# Patient Record
Sex: FEMALE | Race: WHITE | NOT HISPANIC OR LATINO | URBAN - METROPOLITAN AREA
[De-identification: names, ages, dates, MRNs, and addresses within clinical notes are randomized per-mention and may not be internally consistent; named-entity substitution may affect disease eponyms.]

---

## 2017-07-17 ENCOUNTER — EMERGENCY (EMERGENCY)
Facility: HOSPITAL | Age: 37
LOS: 1 days | Discharge: HOME | End: 2017-07-17
Attending: EMERGENCY MEDICINE | Admitting: EMERGENCY MEDICINE
Payer: COMMERCIAL

## 2017-07-17 VITALS
OXYGEN SATURATION: 97 % | SYSTOLIC BLOOD PRESSURE: 135 MMHG | RESPIRATION RATE: 20 BRPM | TEMPERATURE: 98 F | DIASTOLIC BLOOD PRESSURE: 90 MMHG | HEART RATE: 100 BPM

## 2017-07-17 PROCEDURE — 81025 URINE PREGNANCY TEST: CPT

## 2017-07-17 PROCEDURE — 99283 EMERGENCY DEPT VISIT LOW MDM: CPT | Mod: 25

## 2017-07-17 PROCEDURE — 99285 EMERGENCY DEPT VISIT HI MDM: CPT

## 2017-07-17 NOTE — ED ADULT NURSE NOTE - OBJECTIVE STATEMENT
35 y/o w/f bib via ems after a bystander called 911 on her, she was in a parking lot crying hysterically, upon arrival to ER, pt. was crying, upset  and angry. pt. denies any s/hi, no a/v hallucinations or delusions, reports she take paxil 2mg for depression, also, she reports that she was admitted to a  etoh  rehabilitation, but denies any psychiatric admissions.

## 2017-07-17 NOTE — ED PROVIDER NOTE - ATTENDING CONTRIBUTION TO CARE
------------ATTENDING NOTE------------   37 yo F w/ friend c/o feeling anxious and depressed over recent alcohol abuse, describes drinking all day in secret at home, verbally arguing with  over alcohol use, running out of house, calling friend to come to ED, no additional drug or intoxicant use, no trauma, declines SI or HI or hallucinations, awaiting reassessment, reevaluations as sobering, no h/o withdrawal or seizures, last regular drinking 1 month ago -->  - Atilio Gale MD   ----------------------------------------------------------------------------------

## 2017-07-17 NOTE — ED PROVIDER NOTE - NSTIMEPROVIDERCAREINITIATE_GEN_ER
17-Jul-2017 22:55 Pediatric Attending Addendum:  I have read and agree with surrounding PGY1 Note except for any edits above or changes detailed below.   I have spent > 30 minutes with the patient and/or the patient's family on direct patient care.      GEN: NAD alert active  HEENT: MMM, AFOF, no cleft, +red reflex bilaterally  CHEST: nml s1/s2, RRR, no m, lcta bl  Abd: s/nt/nd +bs no hsm  umb c/d/i  Neuro: +grasp/suck/garcía  Skin: no rash appreciated  Musculoskeletal: negative Ortalani/Ndiaye, no clavicular crepitus appreciated, FROM  : external genitalia wnl    Leigha Flores MD Pediatric Hospitalist

## 2017-07-17 NOTE — ED PROVIDER NOTE - OBJECTIVE STATEMENT
35 y/o F p/w depression and 35 y/o F p/w depression and intoxication. Has been having difficulty with her male partner and has been drinking more alcohol lately. Not drinking every day. Never had withdrawal symptoms. Denies SI or HI.  PMHx: None  PSHx: None

## 2017-07-18 VITALS
HEART RATE: 89 BPM | RESPIRATION RATE: 18 BRPM | TEMPERATURE: 98 F | DIASTOLIC BLOOD PRESSURE: 85 MMHG | OXYGEN SATURATION: 98 % | SYSTOLIC BLOOD PRESSURE: 129 MMHG

## 2017-07-18 LAB — HCG UR QL: NEGATIVE — SIGNIFICANT CHANGE UP

## 2017-07-18 NOTE — ED ADULT NURSE REASSESSMENT NOTE - NS ED NURSE REASSESS COMMENT FT1
Pt. was discharged around 0135 accompanied by her friend. pt. was stable for discharge and no evidenced of psychosis, s/hi.It was reiterated to her to follow up w/ her psychiatrist as soon as possible. Pt. was calm, coherent and in control of her impulses when she left the ED.

## 2019-07-12 ENCOUNTER — OTHER (OUTPATIENT)
Age: 39
End: 2019-07-12

## 2019-07-12 DIAGNOSIS — Z01.419 ENCOUNTER FOR GYNECOLOGICAL EXAMINATION (GENERAL) (ROUTINE) W/OUT ABNORMAL FINDINGS: ICD-10-CM

## 2019-12-03 ENCOUNTER — RESULT REVIEW (OUTPATIENT)
Age: 39
End: 2019-12-03

## 2020-12-21 PROBLEM — Z01.419 ENCOUNTER FOR ROUTINE GYNECOLOGICAL EXAMINATION: Status: RESOLVED | Noted: 2019-07-12 | Resolved: 2020-12-21

## 2021-04-08 ENCOUNTER — RESULT REVIEW (OUTPATIENT)
Age: 41
End: 2021-04-08

## 2022-07-12 ENCOUNTER — NON-APPOINTMENT (OUTPATIENT)
Age: 42
End: 2022-07-12

## 2022-09-16 ENCOUNTER — APPOINTMENT (OUTPATIENT)
Dept: ORTHOPEDIC SURGERY | Facility: CLINIC | Age: 42
End: 2022-09-16

## 2022-09-16 VITALS — HEIGHT: 65 IN | WEIGHT: 118 LBS | BODY MASS INDEX: 19.66 KG/M2

## 2022-09-16 DIAGNOSIS — Z78.9 OTHER SPECIFIED HEALTH STATUS: ICD-10-CM

## 2022-09-16 DIAGNOSIS — M25.569 PAIN IN UNSPECIFIED KNEE: ICD-10-CM

## 2022-09-16 PROCEDURE — 99213 OFFICE O/P EST LOW 20 MIN: CPT

## 2022-09-16 PROCEDURE — 73562 X-RAY EXAM OF KNEE 3: CPT | Mod: LT

## 2022-09-16 RX ORDER — DICLOFENAC SODIUM 50 MG/1
50 TABLET, DELAYED RELEASE ORAL
Qty: 40 | Refills: 0 | Status: ACTIVE | COMMUNITY
Start: 2022-09-16 | End: 1900-01-01

## 2022-09-16 NOTE — ASSESSMENT
[FreeTextEntry1] : chronic left knee pain but much worse after sail-boarding august 2022.  sharp medial and anterior knee pain.  possible mmt. \par \par friend of althea

## 2022-09-16 NOTE — PHYSICAL EXAM
[NL (0)] : extension 0 degrees [5___] : hamstring 5[unfilled]/5 [Positive] : positive Uvaldo [Left] : left knee [There are no fractures, subluxations or dislocations. No significant abnormalities are seen] : There are no fractures, subluxations or dislocations. No significant abnormalities are seen [4___] : quadriceps 4[unfilled]/5 [] : non-antalgic [TWNoteComboBox7] : flexion 130 degrees

## 2022-09-16 NOTE — DISCUSSION/SUMMARY
[de-identified] : Instructions:  Progress Note completed by Cari Hall PA-C\par * Dr. Mckeon -- The documentation recorded accurately reflects the decisions made by me during this visit.

## 2022-09-16 NOTE — HISTORY OF PRESENT ILLNESS
[9] : 9 [8] : 8 [Sharp] : sharp [Tightness] : tightness [de-identified] : 9/16/22:  chronic left knee pain but much worse after sail-boarding august 2022.  [] : no [FreeTextEntry1] : left knee [FreeTextEntry6] : senstive

## 2022-09-19 ENCOUNTER — RESULT REVIEW (OUTPATIENT)
Age: 42
End: 2022-09-19

## 2022-09-19 ENCOUNTER — APPOINTMENT (OUTPATIENT)
Dept: MRI IMAGING | Facility: CLINIC | Age: 42
End: 2022-09-19

## 2022-09-30 ENCOUNTER — APPOINTMENT (OUTPATIENT)
Dept: ORTHOPEDIC SURGERY | Facility: CLINIC | Age: 42
End: 2022-09-30

## 2022-09-30 VITALS — BODY MASS INDEX: 19.66 KG/M2 | WEIGHT: 118 LBS | HEIGHT: 65 IN

## 2022-09-30 DIAGNOSIS — S83.92XA SPRAIN OF UNSPECIFIED SITE OF LEFT KNEE, INITIAL ENCOUNTER: ICD-10-CM

## 2022-09-30 PROCEDURE — 99214 OFFICE O/P EST MOD 30 MIN: CPT

## 2022-09-30 NOTE — ASSESSMENT
[FreeTextEntry1] : chronic left knee pain but much worse after sail-boarding august 2022.  horizontal cleavage tear.  looks unstable.  may need surgery but feeling better now.\par \par friend of althea

## 2022-09-30 NOTE — HISTORY OF PRESENT ILLNESS
[Dull/Aching] : dull/aching [Sharp] : sharp [Throbbing] : throbbing [9] : 9 [8] : 8 [Tightness] : tightness [de-identified] : 9/16/22:  chronic left knee pain but much worse after sail-boarding august 2022. \par 9/30/22: still some pain left knee.   [] : no [FreeTextEntry1] : left knee [FreeTextEntry6] : senstive  [de-identified] : physical therapy

## 2022-09-30 NOTE — PHYSICAL EXAM
[NL (0)] : extension 0 degrees [4___] : quadriceps 4[unfilled]/5 [5___] : hamstring 5[unfilled]/5 [Positive] : positive Uvaldo [Left] : left knee [There are no fractures, subluxations or dislocations. No significant abnormalities are seen] : There are no fractures, subluxations or dislocations. No significant abnormalities are seen [] : non-antalgic [TWNoteComboBox7] : flexion 130 degrees

## 2022-11-04 ENCOUNTER — APPOINTMENT (OUTPATIENT)
Dept: ORTHOPEDIC SURGERY | Facility: CLINIC | Age: 42
End: 2022-11-04

## 2022-11-11 ENCOUNTER — APPOINTMENT (OUTPATIENT)
Dept: ORTHOPEDIC SURGERY | Facility: CLINIC | Age: 42
End: 2022-11-11

## 2022-11-11 VITALS — BODY MASS INDEX: 19.66 KG/M2 | WEIGHT: 118 LBS | HEIGHT: 65 IN

## 2022-11-11 DIAGNOSIS — S83.242A OTHER TEAR OF MEDIAL MENISCUS, CURRENT INJURY, LEFT KNEE, INITIAL ENCOUNTER: ICD-10-CM

## 2022-11-11 DIAGNOSIS — M23.92 UNSPECIFIED INTERNAL DERANGEMENT OF LEFT KNEE: ICD-10-CM

## 2022-11-11 PROCEDURE — 99213 OFFICE O/P EST LOW 20 MIN: CPT

## 2022-11-11 NOTE — HISTORY OF PRESENT ILLNESS
[Localized] : localized [9] : 9 [8] : 8 [Dull/Aching] : dull/aching [Sharp] : sharp [Throbbing] : throbbing [Tightness] : tightness [de-identified] : 9/16/22:  chronic left knee pain but much worse after sail-boarding august 2022. \par 9/30/22: still some pain left knee.  \par 11/11/22:  pain and forth.  overall improved but still has pain. [] : no [FreeTextEntry1] : left knee [FreeTextEntry6] : senstive  [de-identified] : physical therapy

## 2024-04-02 ENCOUNTER — INPATIENT (INPATIENT)
Facility: HOSPITAL | Age: 44
LOS: 3 days | Discharge: HOME | DRG: 100 | End: 2024-04-06
Attending: STUDENT IN AN ORGANIZED HEALTH CARE EDUCATION/TRAINING PROGRAM | Admitting: STUDENT IN AN ORGANIZED HEALTH CARE EDUCATION/TRAINING PROGRAM
Payer: COMMERCIAL

## 2024-04-02 VITALS
OXYGEN SATURATION: 95 % | HEART RATE: 93 BPM | SYSTOLIC BLOOD PRESSURE: 125 MMHG | RESPIRATION RATE: 18 BRPM | DIASTOLIC BLOOD PRESSURE: 84 MMHG | HEIGHT: 65 IN | TEMPERATURE: 99 F | WEIGHT: 134.92 LBS

## 2024-04-02 LAB
ALBUMIN SERPL ELPH-MCNC: 4.4 G/DL — SIGNIFICANT CHANGE UP (ref 3.3–5)
ALP SERPL-CCNC: 92 U/L — SIGNIFICANT CHANGE UP (ref 40–120)
ALT FLD-CCNC: 156 U/L — HIGH (ref 10–45)
AMMONIA BLD-MCNC: 26 UMOL/L — SIGNIFICANT CHANGE UP (ref 11–55)
ANION GAP SERPL CALC-SCNC: 23 MMOL/L — HIGH (ref 5–17)
APAP SERPL-MCNC: <15 UG/ML — SIGNIFICANT CHANGE UP (ref 10–30)
AST SERPL-CCNC: 220 U/L — HIGH (ref 10–40)
BILIRUB SERPL-MCNC: 0.9 MG/DL — SIGNIFICANT CHANGE UP (ref 0.2–1.2)
BUN SERPL-MCNC: 15 MG/DL — SIGNIFICANT CHANGE UP (ref 7–23)
CALCIUM SERPL-MCNC: 9.3 MG/DL — SIGNIFICANT CHANGE UP (ref 8.4–10.5)
CHLORIDE SERPL-SCNC: 87 MMOL/L — LOW (ref 96–108)
CO2 SERPL-SCNC: 20 MMOL/L — LOW (ref 22–31)
CREAT SERPL-MCNC: 0.51 MG/DL — SIGNIFICANT CHANGE UP (ref 0.5–1.3)
EGFR: 120 ML/MIN/1.73M2 — SIGNIFICANT CHANGE UP
ETHANOL SERPL-MCNC: <10 MG/DL — SIGNIFICANT CHANGE UP (ref 0–10)
GAS PNL BLDV: SIGNIFICANT CHANGE UP
GLUCOSE SERPL-MCNC: 140 MG/DL — HIGH (ref 70–99)
HCG SERPL-ACNC: <2 MIU/ML — SIGNIFICANT CHANGE UP
HCT VFR BLD CALC: 35.8 % — SIGNIFICANT CHANGE UP (ref 34.5–45)
HGB BLD-MCNC: 12.3 G/DL — SIGNIFICANT CHANGE UP (ref 11.5–15.5)
MCHC RBC-ENTMCNC: 32.6 PG — SIGNIFICANT CHANGE UP (ref 27–34)
MCHC RBC-ENTMCNC: 34.4 GM/DL — SIGNIFICANT CHANGE UP (ref 32–36)
MCV RBC AUTO: 95 FL — SIGNIFICANT CHANGE UP (ref 80–100)
PLATELET # BLD AUTO: 127 K/UL — LOW (ref 150–400)
POTASSIUM SERPL-MCNC: 3.6 MMOL/L — SIGNIFICANT CHANGE UP (ref 3.5–5.3)
POTASSIUM SERPL-SCNC: 3.6 MMOL/L — SIGNIFICANT CHANGE UP (ref 3.5–5.3)
PROT SERPL-MCNC: 7.3 G/DL — SIGNIFICANT CHANGE UP (ref 6–8.3)
RBC # BLD: 3.77 M/UL — LOW (ref 3.8–5.2)
RBC # FLD: 14.6 % — HIGH (ref 10.3–14.5)
SALICYLATES SERPL-MCNC: <2 MG/DL — LOW (ref 15–30)
SODIUM SERPL-SCNC: 130 MMOL/L — LOW (ref 135–145)
WBC # BLD: 2.75 K/UL — LOW (ref 3.8–10.5)
WBC # FLD AUTO: 2.75 K/UL — LOW (ref 3.8–10.5)

## 2024-04-02 PROCEDURE — 99285 EMERGENCY DEPT VISIT HI MDM: CPT

## 2024-04-02 PROCEDURE — 71045 X-RAY EXAM CHEST 1 VIEW: CPT | Mod: 26

## 2024-04-02 PROCEDURE — 70450 CT HEAD/BRAIN W/O DYE: CPT | Mod: 26,MC

## 2024-04-02 RX ORDER — SODIUM CHLORIDE 9 MG/ML
1000 INJECTION INTRAMUSCULAR; INTRAVENOUS; SUBCUTANEOUS ONCE
Refills: 0 | Status: COMPLETED | OUTPATIENT
Start: 2024-04-02 | End: 2024-04-02

## 2024-04-02 RX ORDER — THIAMINE MONONITRATE (VIT B1) 100 MG
100 TABLET ORAL ONCE
Refills: 0 | Status: COMPLETED | OUTPATIENT
Start: 2024-04-02 | End: 2024-04-02

## 2024-04-02 RX ORDER — FOLIC ACID 0.8 MG
1 TABLET ORAL ONCE
Refills: 0 | Status: COMPLETED | OUTPATIENT
Start: 2024-04-02 | End: 2024-04-02

## 2024-04-02 RX ORDER — DIAZEPAM 5 MG
10 TABLET ORAL ONCE
Refills: 0 | Status: DISCONTINUED | OUTPATIENT
Start: 2024-04-02 | End: 2024-04-02

## 2024-04-02 RX ADMIN — Medication 100 MILLIGRAM(S): at 23:24

## 2024-04-02 RX ADMIN — Medication 10 MILLIGRAM(S): at 22:48

## 2024-04-02 RX ADMIN — SODIUM CHLORIDE 1000 MILLILITER(S): 9 INJECTION INTRAMUSCULAR; INTRAVENOUS; SUBCUTANEOUS at 23:26

## 2024-04-02 NOTE — ED ADULT NURSE NOTE - OBJECTIVE STATEMENT
44 y/o Female BIBEMS s/p unwitnessed fall at home. Pt found by  on floor with lack of response to speech. Pt arouse after snoring respirations confused and agitated. As per EMS pt  stated previous hx of alcohol abuse. PMHx anxiety, palpitations. On assessment pt complains of palpitations. Pt denies hx of alcohol abuse, states her last drink was about 4 days ago and was "a few glasses of wine." Pt denies f/c/n/v/d SOB, chest pain. Pt is AxOx4. Pt is continent and ambulatory. Stretcher locked and in lowest position, appropriate side rails up. Pt instructed to notify RN if assistance is needed.

## 2024-04-02 NOTE — ED PROVIDER NOTE - ATTENDING CONTRIBUTION TO CARE
I was the supervising attending. I have independently seen face-to-face and examined the patient. I have reviewed the history and physical and discussed the MDM with the resident, fellow, SONIA and/or student. I agree with the assessment and plan as presented unless otherwise documented as follows:    43F hx anxiety, alcohol use disorder, presenting w/ concern for seizure. Patient states she feels fine and does not recall what happened. Per collateral from patient's mother at bedside, patient's son saw her walking up the stairs when she suddenly collapsed and had whole body shaking. Patient's mother came and observed this, noted spontaneous resolution after which patient was sleepy and confused, did not know who the president was or where she was. Patient's mother endorses patient drinks regularly but unclear when last drink was, thinks she has been trying to cut back recently. Currently back to baseline. Awake and alert, mildly anxious-appearing, no acute distress, tachycardic 100-110s. Mildly tremulous, no signs of traumatic injury. C/f EtOH withdrawal with seizure, will give Valium, obtain labs, CT/XR imaging to evaluate for injuries. -Bruna March MD (Attending)

## 2024-04-02 NOTE — ED PROVIDER NOTE - CLINICAL SUMMARY MEDICAL DECISION MAKING FREE TEXT BOX
Christiano PGY3: 42yo F with PMH of anxiety, palpitations, alcohol abuse presents for possible seizure like activity possibly etoh wd. 1st time, no hx. Unknown if hit head but likely. Differential Diagnosis includes but not limited to alcohol wd vs primary seizure vs intracranial pathology vs electrolyte abnormality. labs, alcohol level, vbg/ammonia, cth. dispo based on results. benzo empirically given tremors and fasciculations in setting of concern for alcohol wd.

## 2024-04-02 NOTE — ED PROVIDER NOTE - PHYSICAL EXAMINATION
CONSTITUTIONAL: guarded, mildly anxious appearing F in no acute distress   SKIN: Warm dry  HEAD: NCAT; no lac/hematoma or ttp, no step-off  EYES: pupils 3mm b/l ,briskly reactive   ENT: MMM; no tongue bite  NECK: Supple; non tender spine   CARD: RRR, no chest/rib/clavicular ttp   RESP: lungs CTA   ABD: S/NT no R/G; no signs of incontinence   EXT: no LE edema/deformity   NEURO: Grossly unremarkable; moving all extremities but +tongue fasciculations and b/l hand tremors.   PSYCH: Cooperative, appropriate.

## 2024-04-02 NOTE — ED PROVIDER NOTE - OBJECTIVE STATEMENT
42yo F with PMH of anxiety, palpitations, alcohol abuse presents for possible seizure like activity. Per 's report, he heard a thud, found her on the floor generalized shaking with lack of response to verbal stimuli. After sleepy with some snoring respirations, when she woke up was confused and belligerent with EMS arrived, demanding to know why they were in the house. Currently denies HA, CP, SOB, abd pain, NVDC. no hx of seizures. Hx of alcohol abuse but per mother at bedside evasive about use even with family and drinks in secret. Pt denies benzo use, endorses 'a few glasses of wine' with last drink reported 4d ago but family cannot collaborate. denies other falls that they know of or hx of seizures. They've been trying to get her to rehab, so she may have been attempting recently to cut back. 44yo F with PMH of anxiety, palpitations, alcohol abuse presents for possible seizure like activity. Per 's report, he heard a thud, found her on the floor generalized shaking with lack of response to verbal stimuli. After sleepy with some snoring respirations, when she woke up was confused and belligerent with EMS arrived, demanding to know why they were in the house. Currently denies HA, CP, SOB, abd pain, NVDC. no hx of seizures. Hx of alcohol abuse but per mother at bedside evasive about use even with family and drinks in secret. Pt denies benzo use, endorses 'a few glasses of wine' with last drink reported 4d ago but family cannot corroborate. Denies other falls that they know of or hx of seizures. They've been trying to get her to rehab, so she may have been attempting recently to cut back.

## 2024-04-02 NOTE — ED ADULT NURSE NOTE - NSICDXPASTMEDICALHX_GEN_ALL_CORE_FT
PAST MEDICAL HISTORY:  Staph Infection s/p right inguinal hernia repair 6 weeks post partum 2009

## 2024-04-03 DIAGNOSIS — F10.99 ALCOHOL USE, UNSPECIFIED WITH UNSPECIFIED ALCOHOL-INDUCED DISORDER: ICD-10-CM

## 2024-04-03 DIAGNOSIS — R74.8 ABNORMAL LEVELS OF OTHER SERUM ENZYMES: ICD-10-CM

## 2024-04-03 DIAGNOSIS — S06.5XAA TRAUMATIC SUBDURAL HEMORRHAGE WITH LOSS OF CONSCIOUSNESS STATUS UNKNOWN, INITIAL ENCOUNTER: ICD-10-CM

## 2024-04-03 DIAGNOSIS — R56.9 UNSPECIFIED CONVULSIONS: ICD-10-CM

## 2024-04-03 DIAGNOSIS — F41.9 ANXIETY DISORDER, UNSPECIFIED: ICD-10-CM

## 2024-04-03 DIAGNOSIS — I60.9 NONTRAUMATIC SUBARACHNOID HEMORRHAGE, UNSPECIFIED: ICD-10-CM

## 2024-04-03 DIAGNOSIS — E87.20 ACIDOSIS, UNSPECIFIED: ICD-10-CM

## 2024-04-03 LAB
ALBUMIN SERPL ELPH-MCNC: 4.2 G/DL — SIGNIFICANT CHANGE UP (ref 3.3–5)
ALP SERPL-CCNC: 84 U/L — SIGNIFICANT CHANGE UP (ref 40–120)
ALT FLD-CCNC: 117 U/L — HIGH (ref 10–45)
ANION GAP SERPL CALC-SCNC: 16 MMOL/L — SIGNIFICANT CHANGE UP (ref 5–17)
APPEARANCE UR: CLEAR — SIGNIFICANT CHANGE UP
APTT BLD: 22.6 SEC — LOW (ref 24.5–35.6)
AST SERPL-CCNC: 150 U/L — HIGH (ref 10–40)
BASOPHILS # BLD AUTO: 0.05 K/UL — SIGNIFICANT CHANGE UP (ref 0–0.2)
BASOPHILS NFR BLD AUTO: 1.8 % — SIGNIFICANT CHANGE UP (ref 0–2)
BILIRUB SERPL-MCNC: 0.8 MG/DL — SIGNIFICANT CHANGE UP (ref 0.2–1.2)
BILIRUB UR-MCNC: NEGATIVE — SIGNIFICANT CHANGE UP
BLD GP AB SCN SERPL QL: NEGATIVE — SIGNIFICANT CHANGE UP
BUN SERPL-MCNC: 14 MG/DL — SIGNIFICANT CHANGE UP (ref 7–23)
CALCIUM SERPL-MCNC: 9.1 MG/DL — SIGNIFICANT CHANGE UP (ref 8.4–10.5)
CHLORIDE SERPL-SCNC: 94 MMOL/L — LOW (ref 96–108)
CK SERPL-CCNC: 715 U/L — HIGH (ref 25–170)
CO2 SERPL-SCNC: 23 MMOL/L — SIGNIFICANT CHANGE UP (ref 22–31)
COLOR SPEC: YELLOW — SIGNIFICANT CHANGE UP
CREAT SERPL-MCNC: 0.52 MG/DL — SIGNIFICANT CHANGE UP (ref 0.5–1.3)
DIFF PNL FLD: NEGATIVE — SIGNIFICANT CHANGE UP
EGFR: 120 ML/MIN/1.73M2 — SIGNIFICANT CHANGE UP
EOSINOPHIL # BLD AUTO: 0.02 K/UL — SIGNIFICANT CHANGE UP (ref 0–0.5)
EOSINOPHIL NFR BLD AUTO: 0.9 % — SIGNIFICANT CHANGE UP (ref 0–6)
FLUAV AG NPH QL: SIGNIFICANT CHANGE UP
FLUBV AG NPH QL: SIGNIFICANT CHANGE UP
GLUCOSE SERPL-MCNC: 87 MG/DL — SIGNIFICANT CHANGE UP (ref 70–99)
GLUCOSE UR QL: NEGATIVE MG/DL — SIGNIFICANT CHANGE UP
HAV IGM SER-ACNC: SIGNIFICANT CHANGE UP
HBV CORE IGM SER-ACNC: SIGNIFICANT CHANGE UP
HBV SURFACE AG SER-ACNC: SIGNIFICANT CHANGE UP
HCT VFR BLD CALC: 33.2 % — LOW (ref 34.5–45)
HCV AB S/CO SERPL IA: 0.06 S/CO — SIGNIFICANT CHANGE UP (ref 0–0.99)
HCV AB SERPL-IMP: SIGNIFICANT CHANGE UP
HGB BLD-MCNC: 11.3 G/DL — LOW (ref 11.5–15.5)
INR BLD: 1 RATIO — SIGNIFICANT CHANGE UP (ref 0.85–1.18)
KETONES UR-MCNC: 40 MG/DL
LEUKOCYTE ESTERASE UR-ACNC: ABNORMAL
LYMPHOCYTES # BLD AUTO: 0.2 K/UL — LOW (ref 1–3.3)
LYMPHOCYTES # BLD AUTO: 7.1 % — LOW (ref 13–44)
MCHC RBC-ENTMCNC: 32.8 PG — SIGNIFICANT CHANGE UP (ref 27–34)
MCHC RBC-ENTMCNC: 34 GM/DL — SIGNIFICANT CHANGE UP (ref 32–36)
MCV RBC AUTO: 96.2 FL — SIGNIFICANT CHANGE UP (ref 80–100)
MONOCYTES # BLD AUTO: 0.27 K/UL — SIGNIFICANT CHANGE UP (ref 0–0.9)
MONOCYTES NFR BLD AUTO: 9.7 % — SIGNIFICANT CHANGE UP (ref 2–14)
NEUTROPHILS # BLD AUTO: 2.19 K/UL — SIGNIFICANT CHANGE UP (ref 1.8–7.4)
NEUTROPHILS NFR BLD AUTO: 79.6 % — HIGH (ref 43–77)
NITRITE UR-MCNC: NEGATIVE — SIGNIFICANT CHANGE UP
NRBC # BLD: 0 /100 WBCS — SIGNIFICANT CHANGE UP (ref 0–0)
PCP SPEC-MCNC: SIGNIFICANT CHANGE UP
PH UR: 7.5 — SIGNIFICANT CHANGE UP (ref 5–8)
PLATELET # BLD AUTO: 106 K/UL — LOW (ref 150–400)
PLATELET MAPPING ACTF MAX AMPLITUDE: 8.1 MM — SIGNIFICANT CHANGE UP (ref 2–19)
PLATELET MAPPING ADP MAXIMUM AMPLITUDE: 29.3 MM — LOW (ref 45–69)
PLATELET MAPPING ADP PERCENT INHIBITION: 56.4 % — HIGH (ref 0–17)
PLATELET MAPPING ARACHIDONIC ACID INHIBITION: 96.3 % — HIGH (ref 0–11)
PLATELET MAPPING HKH MAXIMUM AMPLITUDE: 56.7 MM — SIGNIFICANT CHANGE UP (ref 53–68)
POTASSIUM SERPL-MCNC: 3 MMOL/L — LOW (ref 3.5–5.3)
POTASSIUM SERPL-SCNC: 3 MMOL/L — LOW (ref 3.5–5.3)
PROT SERPL-MCNC: 6.6 G/DL — SIGNIFICANT CHANGE UP (ref 6–8.3)
PROT UR-MCNC: 30 MG/DL
PROTHROM AB SERPL-ACNC: 10.5 SEC — SIGNIFICANT CHANGE UP (ref 9.5–13)
RBC # BLD: 3.45 M/UL — LOW (ref 3.8–5.2)
RBC # FLD: 14.9 % — HIGH (ref 10.3–14.5)
RH IG SCN BLD-IMP: POSITIVE — SIGNIFICANT CHANGE UP
RSV RNA NPH QL NAA+NON-PROBE: SIGNIFICANT CHANGE UP
SARS-COV-2 RNA SPEC QL NAA+PROBE: SIGNIFICANT CHANGE UP
SODIUM SERPL-SCNC: 133 MMOL/L — LOW (ref 135–145)
SP GR SPEC: 1.01 — SIGNIFICANT CHANGE UP (ref 1–1.03)
T3 SERPL-MCNC: 93 NG/DL — SIGNIFICANT CHANGE UP (ref 80–200)
T4 AB SER-ACNC: 5.1 UG/DL — SIGNIFICANT CHANGE UP (ref 4.6–12)
TSH SERPL-MCNC: 2.27 UIU/ML — SIGNIFICANT CHANGE UP (ref 0.27–4.2)
TSH SERPL-MCNC: 6.29 UIU/ML — HIGH (ref 0.27–4.2)
UROBILINOGEN FLD QL: 1 MG/DL — SIGNIFICANT CHANGE UP (ref 0.2–1)
WBC # BLD: 4.97 K/UL — SIGNIFICANT CHANGE UP (ref 3.8–10.5)
WBC # FLD AUTO: 4.97 K/UL — SIGNIFICANT CHANGE UP (ref 3.8–10.5)

## 2024-04-03 PROCEDURE — 99221 1ST HOSP IP/OBS SF/LOW 40: CPT

## 2024-04-03 PROCEDURE — 70450 CT HEAD/BRAIN W/O DYE: CPT | Mod: 26,MC

## 2024-04-03 PROCEDURE — 99223 1ST HOSP IP/OBS HIGH 75: CPT

## 2024-04-03 PROCEDURE — 76705 ECHO EXAM OF ABDOMEN: CPT | Mod: 26

## 2024-04-03 RX ORDER — LANOLIN ALCOHOL/MO/W.PET/CERES
3 CREAM (GRAM) TOPICAL AT BEDTIME
Refills: 0 | Status: DISCONTINUED | OUTPATIENT
Start: 2024-04-03 | End: 2024-04-06

## 2024-04-03 RX ORDER — FOLIC ACID 0.8 MG
1 TABLET ORAL DAILY
Refills: 0 | Status: DISCONTINUED | OUTPATIENT
Start: 2024-04-03 | End: 2024-04-06

## 2024-04-03 RX ORDER — DIAZEPAM 5 MG
10 TABLET ORAL
Refills: 0 | Status: DISCONTINUED | OUTPATIENT
Start: 2024-04-03 | End: 2024-04-06

## 2024-04-03 RX ORDER — ACETAMINOPHEN 500 MG
1000 TABLET ORAL ONCE
Refills: 0 | Status: COMPLETED | OUTPATIENT
Start: 2024-04-03 | End: 2024-04-03

## 2024-04-03 RX ORDER — DESMOPRESSIN ACETATE 0.1 MG/1
24 TABLET ORAL ONCE
Refills: 0 | Status: DISCONTINUED | OUTPATIENT
Start: 2024-04-03 | End: 2024-04-03

## 2024-04-03 RX ORDER — THIAMINE MONONITRATE (VIT B1) 100 MG
100 TABLET ORAL DAILY
Refills: 0 | Status: DISCONTINUED | OUTPATIENT
Start: 2024-04-03 | End: 2024-04-06

## 2024-04-03 RX ORDER — CITALOPRAM 10 MG/1
10 TABLET, FILM COATED ORAL DAILY
Refills: 0 | Status: DISCONTINUED | OUTPATIENT
Start: 2024-04-03 | End: 2024-04-06

## 2024-04-03 RX ORDER — LEVETIRACETAM 250 MG/1
500 TABLET, FILM COATED ORAL
Refills: 0 | Status: DISCONTINUED | OUTPATIENT
Start: 2024-04-03 | End: 2024-04-06

## 2024-04-03 RX ORDER — SODIUM CHLORIDE 9 MG/ML
1000 INJECTION INTRAMUSCULAR; INTRAVENOUS; SUBCUTANEOUS
Refills: 0 | Status: DISCONTINUED | OUTPATIENT
Start: 2024-04-03 | End: 2024-04-04

## 2024-04-03 RX ORDER — POTASSIUM CHLORIDE 20 MEQ
20 PACKET (EA) ORAL
Refills: 0 | Status: COMPLETED | OUTPATIENT
Start: 2024-04-03 | End: 2024-04-03

## 2024-04-03 RX ORDER — METOCLOPRAMIDE HCL 10 MG
10 TABLET ORAL ONCE
Refills: 0 | Status: COMPLETED | OUTPATIENT
Start: 2024-04-03 | End: 2024-04-03

## 2024-04-03 RX ORDER — CHOLECALCIFEROL (VITAMIN D3) 125 MCG
1 CAPSULE ORAL
Refills: 0 | DISCHARGE

## 2024-04-03 RX ORDER — CITALOPRAM 10 MG/1
1 TABLET, FILM COATED ORAL
Refills: 0 | DISCHARGE

## 2024-04-03 RX ORDER — LEVETIRACETAM 250 MG/1
1000 TABLET, FILM COATED ORAL ONCE
Refills: 0 | Status: COMPLETED | OUTPATIENT
Start: 2024-04-03 | End: 2024-04-03

## 2024-04-03 RX ORDER — DESMOPRESSIN ACETATE 0.1 MG/1
24 TABLET ORAL ONCE
Refills: 0 | Status: COMPLETED | OUTPATIENT
Start: 2024-04-03 | End: 2024-04-03

## 2024-04-03 RX ORDER — ALPRAZOLAM 0.25 MG
0.5 TABLET ORAL ONCE
Refills: 0 | Status: DISCONTINUED | OUTPATIENT
Start: 2024-04-03 | End: 2024-04-03

## 2024-04-03 RX ORDER — LISDEXAMFETAMINE DIMESYLATE 70 MG/1
1 CAPSULE ORAL
Refills: 0 | DISCHARGE

## 2024-04-03 RX ORDER — ONDANSETRON 8 MG/1
4 TABLET, FILM COATED ORAL EVERY 8 HOURS
Refills: 0 | Status: DISCONTINUED | OUTPATIENT
Start: 2024-04-03 | End: 2024-04-06

## 2024-04-03 RX ORDER — SODIUM CHLORIDE 9 MG/ML
1000 INJECTION INTRAMUSCULAR; INTRAVENOUS; SUBCUTANEOUS ONCE
Refills: 0 | Status: COMPLETED | OUTPATIENT
Start: 2024-04-03 | End: 2024-04-03

## 2024-04-03 RX ADMIN — Medication 1000 MILLIGRAM(S): at 22:30

## 2024-04-03 RX ADMIN — Medication 20 MILLIEQUIVALENT(S): at 20:46

## 2024-04-03 RX ADMIN — LEVETIRACETAM 500 MILLIGRAM(S): 250 TABLET, FILM COATED ORAL at 17:50

## 2024-04-03 RX ADMIN — LEVETIRACETAM 1000 MILLIGRAM(S): 250 TABLET, FILM COATED ORAL at 01:50

## 2024-04-03 RX ADMIN — DESMOPRESSIN ACETATE 224 MICROGRAM(S): 0.1 TABLET ORAL at 03:28

## 2024-04-03 RX ADMIN — LEVETIRACETAM 600 MILLIGRAM(S): 250 TABLET, FILM COATED ORAL at 01:24

## 2024-04-03 RX ADMIN — Medication 20 MILLIEQUIVALENT(S): at 17:50

## 2024-04-03 RX ADMIN — SODIUM CHLORIDE 75 MILLILITER(S): 9 INJECTION INTRAMUSCULAR; INTRAVENOUS; SUBCUTANEOUS at 17:53

## 2024-04-03 RX ADMIN — Medication 0.5 MILLIGRAM(S): at 22:05

## 2024-04-03 RX ADMIN — Medication 1 TABLET(S): at 11:37

## 2024-04-03 RX ADMIN — DESMOPRESSIN ACETATE 24 MICROGRAM(S): 0.1 TABLET ORAL at 08:18

## 2024-04-03 RX ADMIN — Medication 400 MILLIGRAM(S): at 09:33

## 2024-04-03 RX ADMIN — CITALOPRAM 10 MILLIGRAM(S): 10 TABLET, FILM COATED ORAL at 11:37

## 2024-04-03 RX ADMIN — Medication 20 MILLIEQUIVALENT(S): at 22:05

## 2024-04-03 RX ADMIN — Medication 10 MILLIGRAM(S): at 13:06

## 2024-04-03 RX ADMIN — Medication 1 MILLIGRAM(S): at 00:46

## 2024-04-03 RX ADMIN — Medication 100 MILLIGRAM(S): at 11:37

## 2024-04-03 RX ADMIN — Medication 10 MILLIGRAM(S): at 01:49

## 2024-04-03 RX ADMIN — Medication 400 MILLIGRAM(S): at 22:07

## 2024-04-03 RX ADMIN — Medication 1 MILLIGRAM(S): at 11:37

## 2024-04-03 RX ADMIN — SODIUM CHLORIDE 1000 MILLILITER(S): 9 INJECTION INTRAMUSCULAR; INTRAVENOUS; SUBCUTANEOUS at 08:18

## 2024-04-03 RX ADMIN — SODIUM CHLORIDE 1000 MILLILITER(S): 9 INJECTION INTRAMUSCULAR; INTRAVENOUS; SUBCUTANEOUS at 11:40

## 2024-04-03 NOTE — CONSULT NOTE ADULT - ASSESSMENT
Jazmine Arias  43F Hx anxiety, palpitations EtOH p/f fall syncope vs seizure. Denied AC/AP. Plt 127, coags-wnl, TEG w/56% inhibition. CTH w/trace R aSDH no mass effect.   Exam: intact  -Keppra load, 500bid x7d, DDAVP given EtOH use and low plt/inhibition  -CTH in 4h, if stable ok to liberalize neurochecks from q1 to q4h  -Will need long interval CTH in 24h, if stable can start DVT chemoppx 24h afterwards  -No acute neurosurgical intervention

## 2024-04-03 NOTE — CONSULT NOTE ADULT - SUBJECTIVE AND OBJECTIVE BOX
p ([id number])     HPI: Jazmine Arias  43F Hx anxiety, palpitations EtOH p/f fall syncope vs seizure. Denied AC/AP. Plt 127, coags-wnl, TEG w/56% inhibition. CTH w/trace R aSDH no mass effect.   Exam: intact      =====================  PAST MEDICAL HISTORY   Staph Infection      PAST SURGICAL HISTORY    Section    S/P hernia repair      No Known Allergies      MEDICATIONS:  Antibiotics:    Neuro:    Other:  desmopressin IVPB 24 MICROGram(s) IV Intermittent Once      SOCIAL HISTORY:   Occupation:   Marital Status:     FAMILY HISTORY:      ROS: Negative except per HPI    LABS:  PT/INR - ( 2024 01:51 )   PT: 10.5 sec;   INR: 1.00 ratio         PTT - ( 2024 01:51 )  PTT:22.6 sec                        12.3   2.75  )-----------( 127      ( 2024 22:48 )             35.8     04-02    130<L>  |  87<L>  |  15  ----------------------------<  140<H>  3.6   |  20<L>  |  0.51    Ca    9.3      2024 22:48    TPro  7.3  /  Alb  4.4  /  TBili  0.9  /  DBili  x   /  AST  220<H>  /  ALT  156<H>  /  AlkPhos  92  04-02

## 2024-04-03 NOTE — H&P ADULT - NSHPSOCIALHISTORY_GEN_ALL_CORE
Denies tobacco use. Admits to alcohol use "a couple times a week." Admits to CBD gummies. Denies marijuana/vaping/illicit drug use.

## 2024-04-03 NOTE — PATIENT PROFILE ADULT - FALL HARM RISK - HARM RISK INTERVENTIONS

## 2024-04-03 NOTE — H&P ADULT - PROBLEM SELECTOR PLAN 2
CTH with small right holohemispheric subdural hematoma with trace hemorrhage along the posterior right falx and layering on the right tentorium. Trace right parietal subarachnoid hemorrhage.   Repeat CTH in 4H showed stable SAH/SDH   Neurosurgery recommendations appreciated - No acute neurosurgical intervention. s/p DDAVP and Keppra load.  Continue Keppra as above   Seizure Precautions. Neuro Checks Q4H   Repeat CTH in 24H

## 2024-04-03 NOTE — CONSULT NOTE ADULT - ATTENDING COMMENTS
43F Hx anxiety, palpitations EtOH p/f fall syncope vs seizure. Denied AC/AP. Plt 127, coags-wnl, TEG w/56% inhibition. CTH w/trace R aSDH no mass effect.   Exam: intact  -Keppra load, 500bid x7d, DDAVP given EtOH use and low plt/inhibition  -CTH in 4h, if stable ok to liberalize neurochecks from q1 to q4h  -Will need long interval CTH in 24h, if stable can start DVT chemoppx 24h afterwards  -No acute neurosurgical intervention

## 2024-04-03 NOTE — H&P ADULT - PROBLEM SELECTOR PLAN 6
Patient with anion gap metabolic acidosis, secondary to alcoholic ketoacidosis vs lactic acidosis  s/p IVF in ED, continue with additional bolus  Trend lactate

## 2024-04-03 NOTE — H&P ADULT - PROBLEM SELECTOR PLAN 1
Patient presenting with episode of seizure-like activity  Likely secondary to alcohol withdrawal, ?benzodiazepine withdrawal. Patient reports she took Ativan intermittently for anxiety - last dose a week ago.   s/p Keppra load, continue Keppra 500mg BID x 7 days per neurosurgery  CIWA as below  Seizure Precautions  Check CPK

## 2024-04-03 NOTE — H&P ADULT - PROBLEM SELECTOR PLAN 7
Patient reports she takes citalopram and alprazolam PRN- though self-discontinued as she felt she was feeling fine.  Resume citalopram 10mg daily    DVT PPx: Low risk

## 2024-04-03 NOTE — CHART NOTE - NSCHARTNOTEFT_GEN_A_CORE
This report was requested by: Radha Vance | Reference #: [id number]    Practitioner Count: 1  Pharmacy Count: 1  Current Opioid Prescriptions: 0  Current Benzodiazepine Prescriptions: 0  Current Stimulant Prescriptions: 0      Patient Demographic Information (PDI)       PDI	First Name	Last Name	Birth Date	Gender	Street Address	[facility]	Zip Code  RYAN Arias	1980	Female	[address]	[facility]	[address]    Prescription Information      PDI Filter:    PDI	My Rx	Current Rx	Drug Type	Rx Written	Rx Dispensed	Drug	Quantity	Days Supply	Prescriber Name	Prescriber GABY #	Payment Method	Dispenser  A	N	N	S	02/14/2024	02/17/2024	vyvanse 40 mg capsule	30	30	Beka Gamboa	[id number]	Insurance	[facility] #[id number]  A	N	N	B	02/14/2024	02/17/2024	alprazolam 0.5 mg tablet	30	30	Beka Gamboa	[id number]	Insurance	[facility] #[id number]  A	N	N	B	01/12/2024	01/20/2024	alprazolam 0.5 mg tablet	30	30	Beka Gamboa	[id number]	Insurance	[facility] #[id number]  A	N	N	B	12/20/2023	12/22/2023	alprazolam 0.5 mg tablet	30	30	Beka Gamboa	[id number]	Insurance	[facility] #[id number]  A	N	N	S	11/30/2023	11/30/2023	vyvanse 40 mg capsule	30	30	Beka Gamboa	[id number]	Insurance	[facility] #[id number]  A	N	N	B	11/08/2023	11/11/2023	alprazolam 0.5 mg tablet	30	30	Beka Gamboa	[id number]	Insurance	[facility] #[id number]  A	N	N	B	09/18/2023	09/21/2023	alprazolam 0.5 mg tablet	25	30	Beka Gamboa	[id number]	Insurance	[facility] #[id number]  A	N	N	S	09/18/2023	09/21/2023	lisdexamfetamine 40 mg capsule	30	30	Beka Gamboa	[id number]	Insurance	[facility] #[id number]  A	N	N	S	05/30/2023	06/15/2023	vyvanse 40 mg capsule	30	30	Beka Gamboa	[id number]	Insurance	[facility] #[id number]  A	N	N	B	05/30/2023	05/30/2023	alprazolam 0.5 mg tablet	25	25	Beka Gamboa	[id number]	Insurance	[facility] #[id number]  A	N	N	S	04/24/2023	04/24/2023	vyvanse 40 mg capsule	30	30	Beka Gamboa	[id number]	Insurance	[facility] #[id number]  A	N	N	B	04/24/2023	04/24/2023	alprazolam 0.5 mg tablet	25	25	Beka Gamboa	[id number]	Insurance	[facility] #[id number]    * - Details of Drug Type : O = Opioid, B = Benzodiazepine, S = Stimulant
6h rpt CTH reviewed, stable. Please obtain rpt CTH in AM as long-interval scan. If stable, OK for DVT ppx 24h thereafter    Outpatient f/u with Dr. Navarro after d/c

## 2024-04-03 NOTE — H&P ADULT - HISTORY OF PRESENT ILLNESS
43F with PMHx of ADHD, Anxiety, Palpitations, Alcohol Use Disorder presents due to seizure-like activity. Patient was seen and examined at bedside-  she is AAOx4. She remembers eating dinner last evening. She picked up some boxes to bring upstairs and the next thing she remembers is being on the floor. She was in her usual state of health prior to this -no recent sick contact/travel. She notes that she is prescribed lisdexamfetamine for ADHD and citalopram and alprazolam PRN for anxiety - she felt like she was feeling fine and has not been taking them for the last week.   Per chart review, she was found on the floor by her family after hearing a thud. She was found to have "generalized shaking with lack of response to verbal stimuli." Once she was awake - she was confused and belligerent with EMS. Per patient, no prior history of seizures or alcohol withdrawal. She reports her last alcohol drink was a month ago, though she told ED physician it was 4 days ago. Family at bedside reports she is evasive about alcohol use. They have been "trying to get her to rehab, so she may have been attempting recently to cut back"    At present, patient reports headache and anxiety. Denies fever, chills, chest pain, palpitations, dyspnea, N/V, abdominal pain, dysuria, diarrhea.

## 2024-04-03 NOTE — H&P ADULT - NSHPPHYSICALEXAM_GEN_ALL_CORE
Vital Signs Last 24 Hrs  T(C): 36.8 (03 Apr 2024 08:50), Max: 37.2 (02 Apr 2024 22:19)  T(F): 98.2 (03 Apr 2024 08:50), Max: 99 (02 Apr 2024 22:19)  HR: 93 (03 Apr 2024 08:50) (83 - 98)  BP: 126/86 (03 Apr 2024 08:50) (125/84 - 148/108)  BP(mean): 105 (03 Apr 2024 08:25) (105 - 120)  RR: 16 (03 Apr 2024 08:50) (15 - 26)  SpO2: 98% (03 Apr 2024 08:50) (95% - 98%)    Parameters below as of 03 Apr 2024 08:50  Patient On (Oxygen Delivery Method): room air        CONSTITUTIONAL: Well-groomed, in no apparent distress  EYES: No conjunctival or scleral injection, non-icteric;   ENMT: No external nasal lesions; MMM  NECK: Trachea midline   RESPIRATORY: Breathing comfortably; lungs CTA without wheeze/rhonchi/rales  CARDIOVASCULAR: +S1S2, RRR, no M/G/R; no lower extremity edema  GASTROINTESTINAL: No palpable masses or tenderness, no rebound/guarding  SKIN: Warm, dry  NEUROLOGIC: CN II-XII intact; sensation intact in LEs b/l to light touch  PSYCHIATRIC: A+O x 3; mood and affect appropriate; appropriate insight and judgment

## 2024-04-03 NOTE — H&P ADULT - ASSESSMENT
43F with PMHx of ADHD, Anxiety, Palpitations, Alcohol Use Disorder presents due to seizure-like activity.

## 2024-04-04 DIAGNOSIS — E87.1 HYPO-OSMOLALITY AND HYPONATREMIA: ICD-10-CM

## 2024-04-04 LAB
ALBUMIN SERPL ELPH-MCNC: 4 G/DL — SIGNIFICANT CHANGE UP (ref 3.3–5)
ALP SERPL-CCNC: 91 U/L — SIGNIFICANT CHANGE UP (ref 40–120)
ALT FLD-CCNC: 108 U/L — HIGH (ref 10–45)
ANION GAP SERPL CALC-SCNC: 18 MMOL/L — HIGH (ref 5–17)
AST SERPL-CCNC: 136 U/L — HIGH (ref 10–40)
BILIRUB SERPL-MCNC: 0.8 MG/DL — SIGNIFICANT CHANGE UP (ref 0.2–1.2)
BUN SERPL-MCNC: 7 MG/DL — SIGNIFICANT CHANGE UP (ref 7–23)
CALCIUM SERPL-MCNC: 9 MG/DL — SIGNIFICANT CHANGE UP (ref 8.4–10.5)
CHLORIDE SERPL-SCNC: 94 MMOL/L — LOW (ref 96–108)
CK SERPL-CCNC: 474 U/L — HIGH (ref 25–170)
CO2 SERPL-SCNC: 18 MMOL/L — LOW (ref 22–31)
CREAT SERPL-MCNC: 0.38 MG/DL — LOW (ref 0.5–1.3)
EGFR: 129 ML/MIN/1.73M2 — SIGNIFICANT CHANGE UP
GLUCOSE SERPL-MCNC: 98 MG/DL — SIGNIFICANT CHANGE UP (ref 70–99)
HCT VFR BLD CALC: 36.8 % — SIGNIFICANT CHANGE UP (ref 34.5–45)
HGB BLD-MCNC: 12.3 G/DL — SIGNIFICANT CHANGE UP (ref 11.5–15.5)
LACTATE SERPL-SCNC: 0.7 MMOL/L — SIGNIFICANT CHANGE UP (ref 0.5–2)
MCHC RBC-ENTMCNC: 32.3 PG — SIGNIFICANT CHANGE UP (ref 27–34)
MCHC RBC-ENTMCNC: 33.4 GM/DL — SIGNIFICANT CHANGE UP (ref 32–36)
MCV RBC AUTO: 96.6 FL — SIGNIFICANT CHANGE UP (ref 80–100)
NRBC # BLD: 0 /100 WBCS — SIGNIFICANT CHANGE UP (ref 0–0)
PLATELET # BLD AUTO: 124 K/UL — LOW (ref 150–400)
POTASSIUM SERPL-MCNC: 3.6 MMOL/L — SIGNIFICANT CHANGE UP (ref 3.5–5.3)
POTASSIUM SERPL-SCNC: 3.6 MMOL/L — SIGNIFICANT CHANGE UP (ref 3.5–5.3)
PROT SERPL-MCNC: 6.9 G/DL — SIGNIFICANT CHANGE UP (ref 6–8.3)
RBC # BLD: 3.81 M/UL — SIGNIFICANT CHANGE UP (ref 3.8–5.2)
RBC # FLD: 14.4 % — SIGNIFICANT CHANGE UP (ref 10.3–14.5)
SODIUM SERPL-SCNC: 130 MMOL/L — LOW (ref 135–145)
WBC # BLD: 3.91 K/UL — SIGNIFICANT CHANGE UP (ref 3.8–10.5)
WBC # FLD AUTO: 3.91 K/UL — SIGNIFICANT CHANGE UP (ref 3.8–10.5)

## 2024-04-04 PROCEDURE — 99232 SBSQ HOSP IP/OBS MODERATE 35: CPT

## 2024-04-04 PROCEDURE — 70450 CT HEAD/BRAIN W/O DYE: CPT | Mod: 26

## 2024-04-04 RX ORDER — ACETAMINOPHEN 500 MG
1000 TABLET ORAL ONCE
Refills: 0 | Status: COMPLETED | OUTPATIENT
Start: 2024-04-04 | End: 2024-04-04

## 2024-04-04 RX ORDER — ACETAMINOPHEN 500 MG
650 TABLET ORAL EVERY 6 HOURS
Refills: 0 | Status: DISCONTINUED | OUTPATIENT
Start: 2024-04-04 | End: 2024-04-06

## 2024-04-04 RX ORDER — SODIUM CHLORIDE 9 MG/ML
1000 INJECTION INTRAMUSCULAR; INTRAVENOUS; SUBCUTANEOUS
Refills: 0 | Status: DISCONTINUED | OUTPATIENT
Start: 2024-04-04 | End: 2024-04-06

## 2024-04-04 RX ADMIN — LEVETIRACETAM 500 MILLIGRAM(S): 250 TABLET, FILM COATED ORAL at 17:46

## 2024-04-04 RX ADMIN — Medication 100 MILLIGRAM(S): at 11:53

## 2024-04-04 RX ADMIN — CITALOPRAM 10 MILLIGRAM(S): 10 TABLET, FILM COATED ORAL at 11:53

## 2024-04-04 RX ADMIN — Medication 10 MILLIGRAM(S): at 10:45

## 2024-04-04 RX ADMIN — SODIUM CHLORIDE 75 MILLILITER(S): 9 INJECTION INTRAMUSCULAR; INTRAVENOUS; SUBCUTANEOUS at 18:33

## 2024-04-04 RX ADMIN — Medication 1 TABLET(S): at 11:53

## 2024-04-04 RX ADMIN — Medication 1 MILLIGRAM(S): at 11:53

## 2024-04-04 RX ADMIN — Medication 400 MILLIGRAM(S): at 04:38

## 2024-04-04 RX ADMIN — LEVETIRACETAM 500 MILLIGRAM(S): 250 TABLET, FILM COATED ORAL at 05:01

## 2024-04-05 ENCOUNTER — RESULT REVIEW (OUTPATIENT)
Age: 44
End: 2024-04-05

## 2024-04-05 ENCOUNTER — TRANSCRIPTION ENCOUNTER (OUTPATIENT)
Age: 44
End: 2024-04-05

## 2024-04-05 VITALS
RESPIRATION RATE: 18 BRPM | OXYGEN SATURATION: 99 % | SYSTOLIC BLOOD PRESSURE: 130 MMHG | DIASTOLIC BLOOD PRESSURE: 90 MMHG | HEART RATE: 87 BPM | TEMPERATURE: 98 F

## 2024-04-05 LAB
ALBUMIN SERPL ELPH-MCNC: 4 G/DL — SIGNIFICANT CHANGE UP (ref 3.3–5)
ALP SERPL-CCNC: 81 U/L — SIGNIFICANT CHANGE UP (ref 40–120)
ALT FLD-CCNC: 88 U/L — HIGH (ref 10–45)
ANION GAP SERPL CALC-SCNC: 13 MMOL/L — SIGNIFICANT CHANGE UP (ref 5–17)
AST SERPL-CCNC: 79 U/L — HIGH (ref 10–40)
BILIRUB SERPL-MCNC: 0.6 MG/DL — SIGNIFICANT CHANGE UP (ref 0.2–1.2)
BUN SERPL-MCNC: 9 MG/DL — SIGNIFICANT CHANGE UP (ref 7–23)
CALCIUM SERPL-MCNC: 9.1 MG/DL — SIGNIFICANT CHANGE UP (ref 8.4–10.5)
CHLORIDE SERPL-SCNC: 102 MMOL/L — SIGNIFICANT CHANGE UP (ref 96–108)
CO2 SERPL-SCNC: 21 MMOL/L — LOW (ref 22–31)
CREAT SERPL-MCNC: 0.5 MG/DL — SIGNIFICANT CHANGE UP (ref 0.5–1.3)
EGFR: 121 ML/MIN/1.73M2 — SIGNIFICANT CHANGE UP
GLUCOSE SERPL-MCNC: 91 MG/DL — SIGNIFICANT CHANGE UP (ref 70–99)
HCT VFR BLD CALC: 37.1 % — SIGNIFICANT CHANGE UP (ref 34.5–45)
HGB BLD-MCNC: 12.6 G/DL — SIGNIFICANT CHANGE UP (ref 11.5–15.5)
MCHC RBC-ENTMCNC: 33 PG — SIGNIFICANT CHANGE UP (ref 27–34)
MCHC RBC-ENTMCNC: 34 GM/DL — SIGNIFICANT CHANGE UP (ref 32–36)
MCV RBC AUTO: 97.1 FL — SIGNIFICANT CHANGE UP (ref 80–100)
NRBC # BLD: 0 /100 WBCS — SIGNIFICANT CHANGE UP (ref 0–0)
PLATELET # BLD AUTO: 161 K/UL — SIGNIFICANT CHANGE UP (ref 150–400)
POTASSIUM SERPL-MCNC: 4.4 MMOL/L — SIGNIFICANT CHANGE UP (ref 3.5–5.3)
POTASSIUM SERPL-SCNC: 4.4 MMOL/L — SIGNIFICANT CHANGE UP (ref 3.5–5.3)
PROT SERPL-MCNC: 6.5 G/DL — SIGNIFICANT CHANGE UP (ref 6–8.3)
RBC # BLD: 3.82 M/UL — SIGNIFICANT CHANGE UP (ref 3.8–5.2)
RBC # FLD: 14.2 % — SIGNIFICANT CHANGE UP (ref 10.3–14.5)
SODIUM SERPL-SCNC: 136 MMOL/L — SIGNIFICANT CHANGE UP (ref 135–145)
WBC # BLD: 4.82 K/UL — SIGNIFICANT CHANGE UP (ref 3.8–10.5)
WBC # FLD AUTO: 4.82 K/UL — SIGNIFICANT CHANGE UP (ref 3.8–10.5)

## 2024-04-05 PROCEDURE — 76376 3D RENDER W/INTRP POSTPROCES: CPT | Mod: 26

## 2024-04-05 PROCEDURE — 99239 HOSP IP/OBS DSCHRG MGMT >30: CPT

## 2024-04-05 PROCEDURE — 93356 MYOCRD STRAIN IMG SPCKL TRCK: CPT

## 2024-04-05 PROCEDURE — 93306 TTE W/DOPPLER COMPLETE: CPT | Mod: 26

## 2024-04-05 RX ORDER — TRAMADOL HYDROCHLORIDE 50 MG/1
25 TABLET ORAL AT BEDTIME
Refills: 0 | Status: DISCONTINUED | OUTPATIENT
Start: 2024-04-05 | End: 2024-04-05

## 2024-04-05 RX ORDER — ACETAMINOPHEN 500 MG
1000 TABLET ORAL ONCE
Refills: 0 | Status: COMPLETED | OUTPATIENT
Start: 2024-04-05 | End: 2024-04-05

## 2024-04-05 RX ORDER — LEVETIRACETAM 250 MG/1
1 TABLET, FILM COATED ORAL
Qty: 10 | Refills: 0
Start: 2024-04-05 | End: 2024-04-09

## 2024-04-05 RX ADMIN — Medication 650 MILLIGRAM(S): at 00:38

## 2024-04-05 RX ADMIN — Medication 100 MILLIGRAM(S): at 13:35

## 2024-04-05 RX ADMIN — TRAMADOL HYDROCHLORIDE 25 MILLIGRAM(S): 50 TABLET ORAL at 22:52

## 2024-04-05 RX ADMIN — Medication 1000 MILLIGRAM(S): at 14:20

## 2024-04-05 RX ADMIN — Medication 3 MILLIGRAM(S): at 00:38

## 2024-04-05 RX ADMIN — Medication 1 TABLET(S): at 13:34

## 2024-04-05 RX ADMIN — Medication 650 MILLIGRAM(S): at 01:38

## 2024-04-05 RX ADMIN — Medication 400 MILLIGRAM(S): at 13:37

## 2024-04-05 RX ADMIN — TRAMADOL HYDROCHLORIDE 25 MILLIGRAM(S): 50 TABLET ORAL at 21:52

## 2024-04-05 RX ADMIN — Medication 1 MILLIGRAM(S): at 13:35

## 2024-04-05 RX ADMIN — LEVETIRACETAM 500 MILLIGRAM(S): 250 TABLET, FILM COATED ORAL at 05:16

## 2024-04-05 RX ADMIN — LEVETIRACETAM 500 MILLIGRAM(S): 250 TABLET, FILM COATED ORAL at 17:48

## 2024-04-05 RX ADMIN — CITALOPRAM 10 MILLIGRAM(S): 10 TABLET, FILM COATED ORAL at 13:36

## 2024-04-05 NOTE — DISCHARGE NOTE PROVIDER - CARE PROVIDER_API CALL
Benji Puri  Cardiovascular Disease  [address], [address]  [place] [address]  Phone: [phone]  Fax: [phone]  Follow Up Time:     Jose Carlos Navarro  Neurosurgery  [address], [address]  [place] [address]  Phone: [phone]  Fax: [phone]  Follow Up Time:

## 2024-04-05 NOTE — DISCHARGE NOTE PROVIDER - HOSPITAL COURSE
HPI:  43F with PMHx of ADHD, Anxiety, Palpitations, Alcohol Use Disorder presents due to seizure-like activity. Patient was seen and examined at bedside-  she is AAOx4. She remembers eating dinner last evening. She picked up some boxes to bring upstairs and the next thing she remembers is being on the floor. She was in her usual state of health prior to this -no recent sick contact/travel. She notes that she is prescribed lisdexamfetamine for ADHD and citalopram and alprazolam PRN for anxiety - she felt like she was feeling fine and has not been taking them for the last week.   Per chart review, she was found on the floor by her family after hearing a thud. She was found to have "generalized shaking with lack of response to verbal stimuli." Once she was awake - she was confused and belligerent with EMS. Per patient, no prior history of seizures or alcohol withdrawal. She reports her last alcohol drink was a month ago, though she told ED physician it was 4 days ago. Family at bedside reports she is evasive about alcohol use. They have been "trying to get her to rehab, so she may have been attempting recently to cut back"    At present, patient reports headache and anxiety. Denies fever, chills, chest pain, palpitations, dyspnea, N/V, abdominal pain, dysuria, diarrhea. (03 Apr 2024 09:25)    Hospital Course:  Pt admitted for seizure like activity - pt had CTH on admission which showed small R holohemispheric SDH and trace R parietal SAH. Pt given keppra load and started on keppra x 7 days; NSGY evaluated pt and recommended repeat CTH for stability - had 6h and 24hr repeat scans which were showed stable bleed. Pt also placed on CIWA on admission for possible etoh abuse/withdrawal, but pt denies heavy alcohol use and CIWA monitoring only noted R sided headache (more likely from her fall). Pt refused all SBIRT services. Of note, pt states she has had presyncopal events in the past - her seizure like activity may have been convulsive syncope. Thus TTE was checked, which showed _____________.   Pt with stable vitals, not triggering CIWA, ambulating without issue.   Stable for d/c home.     Important Medication Changes and Reason:  START keppra 500mg BID for 5 more days     Active or Pending Issues Requiring Follow-up:  f/u with PMD  f/u with neurosurgery Dr Navarro    Advanced Directives:   [X] Full code  [ ] DNR  [ ] Hospice    Discharge Diagnoses:  Seizure like activity   Traumatic subdural hematoma (SDH).   SAH  Alcohol use with alcohol-induced disorder.   Hyponatremia  Metabolic acidosis   Anxiety  elevated transaminases          HPI:  43F with PMHx of ADHD, Anxiety, Palpitations, Alcohol Use Disorder presents due to seizure-like activity. Patient was seen and examined at bedside-  she is AAOx4. She remembers eating dinner last evening. She picked up some boxes to bring upstairs and the next thing she remembers is being on the floor. She was in her usual state of health prior to this -no recent sick contact/travel. She notes that she is prescribed lisdexamfetamine for ADHD and citalopram and alprazolam PRN for anxiety - she felt like she was feeling fine and has not been taking them for the last week.   Per chart review, she was found on the floor by her family after hearing a thud. She was found to have "generalized shaking with lack of response to verbal stimuli." Once she was awake - she was confused and belligerent with EMS. Per patient, no prior history of seizures or alcohol withdrawal. She reports her last alcohol drink was a month ago, though she told ED physician it was 4 days ago. Family at bedside reports she is evasive about alcohol use. They have been "trying to get her to rehab, so she may have been attempting recently to cut back"    At present, patient reports headache and anxiety. Denies fever, chills, chest pain, palpitations, dyspnea, N/V, abdominal pain, dysuria, diarrhea. (03 Apr 2024 09:25)    Hospital Course:  Pt admitted for seizure like activity - pt had CTH on admission which showed small R holohemispheric SDH and trace R parietal SAH. Pt given keppra load and started on keppra x 7 days; NSGY evaluated pt and recommended repeat CTH for stability - had 6h and 24hr repeat scans which were showed stable bleed. Pt also placed on CIWA on admission for possible etoh abuse/withdrawal, but pt denies heavy alcohol use and CIWA monitoring only noted R sided headache (more likely from her fall). Pt refused all SBIRT services. Of note, pt states she has had presyncopal events in the past - her seizure like activity may have been convulsive syncope. Thus TTE was checked, unremarkable and all findings within normal limits.   Pt with stable vitals, not triggering CIWA, ambulating without issue.   Stable for d/c home.     Important Medication Changes and Reason:  START keppra 500mg BID for 5 more days     Active or Pending Issues Requiring Follow-up:  f/u with PMD  f/u with neurosurgery Dr Navarro    Advanced Directives:   [X] Full code  [ ] DNR  [ ] Hospice    Discharge Diagnoses:  Seizure like activity   Traumatic subdural hematoma (SDH).   SAH  Alcohol use with alcohol-induced disorder.   Hyponatremia  Metabolic acidosis   Anxiety  elevated transaminases          HPI:  43F with PMHx of ADHD, Anxiety, Palpitations, Alcohol Use Disorder presents due to seizure-like activity. Patient was seen and examined at bedside-  she is AAOx4. She remembers eating dinner last evening. She picked up some boxes to bring upstairs and the next thing she remembers is being on the floor. She was in her usual state of health prior to this -no recent sick contact/travel. She notes that she is prescribed lisdexamfetamine for ADHD and citalopram and alprazolam PRN for anxiety - she felt like she was feeling fine and has not been taking them for the last week.   Per chart review, she was found on the floor by her family after hearing a thud. She was found to have "generalized shaking with lack of response to verbal stimuli." Once she was awake - she was confused and belligerent with EMS. Per patient, no prior history of seizures or alcohol withdrawal. She reports her last alcohol drink was a month ago, though she told ED physician it was 4 days ago. Family at bedside reports she is evasive about alcohol use. They have been "trying to get her to rehab, so she may have been attempting recently to cut back"    At present, patient reports headache and anxiety. Denies fever, chills, chest pain, palpitations, dyspnea, N/V, abdominal pain, dysuria, diarrhea. (03 Apr 2024 09:25)    Hospital Course:  Pt admitted for seizure like activity - pt had CTH on admission which showed small R holohemispheric SDH and trace R parietal SAH. Pt given keppra load and started on keppra x 7 days; NSGY evaluated pt and recommended repeat CTH for stability - had 6h and 24hr repeat scans which were showed stable bleed. Pt also placed on CIWA on admission for possible etoh abuse/withdrawal, but pt denies heavy alcohol use and CIWA monitoring only noted R sided headache (more likely from her fall). Pt refused all SBIRT services. Of note, pt states she has had presyncopal events in the past - her seizure like activity may have been convulsive syncope. Thus TTE was checked, unremarkable and all findings within normal limits.   Pt with stable vitals, not triggering CIWA, ambulating without issue.   Stable for d/c home.     Important Medication Changes and Reason:  START keppra 500mg BID for 5 more days   Tramadol 25mg Q8 hours PRN for severe headache x 3 days    Active or Pending Issues Requiring Follow-up:  f/u with PMD  f/u with neurosurgery Dr Navarro    Advanced Directives:   [X] Full code  [ ] DNR  [ ] Hospice    Discharge Diagnoses:  Seizure like activity   Traumatic subdural hematoma (SDH).   SAH  Alcohol use with alcohol-induced disorder.   Hyponatremia  Metabolic acidosis   Anxiety  elevated transaminases

## 2024-04-05 NOTE — DISCHARGE NOTE PROVIDER - NSDCCPCAREPLAN_GEN_ALL_CORE_FT
PRINCIPAL DISCHARGE DIAGNOSIS  Diagnosis: Seizure  Assessment and Plan of Treatment: You were admitted for siezure like activity, which may have been either a seizure from alcohol withdrawal vs convulsive syncope. You were monitored for withdrawal and given medications as needed. You had a TTE which was normal.   You are stable to be discharged, please follow up with your PMD  Please continue to take keppra 500mg twice daily for further seizure prophylaxis.      SECONDARY DISCHARGE DIAGNOSES  Diagnosis: Traumatic subdural hematoma (SDH)  Assessment and Plan of Treatment: Due to the headstrike from your fall, you were noted with a brain bleed on your right side. You had multiple CT scans which showed the bleeding was stable and not enlarging. You were given antiseizure medications to prevent any seizures.   Please continue to take Keppra 500mg twice daily for another 5 days.   Please follow up with neurosugery Dr Navarro    Diagnosis: Abnormal transaminases  Assessment and Plan of Treatment: You had some mildly elevated liver numbers, which were getting better. You had an ultrasound of your liver which was normal.   Please follow-up with your PMD     PRINCIPAL DISCHARGE DIAGNOSIS  Diagnosis: Seizure  Assessment and Plan of Treatment: You were admitted for siezure like activity, which may have been either a seizure from alcohol withdrawal vs convulsive syncope. You were monitored for withdrawal and given medications as needed. You had a TTE which was normal.   You are stable to be discharged, please follow up with your PMD  Please continue to take keppra 500mg twice daily for further seizure prophylaxis.      SECONDARY DISCHARGE DIAGNOSES  Diagnosis: Traumatic subdural hematoma (SDH)  Assessment and Plan of Treatment: Due to the headstrike from your fall, you were noted with a brain bleed on your right side. You had multiple CT scans which showed the bleeding was stable and not enlarging. You were given antiseizure medications to prevent any seizures.   Please continue to take Keppra 500mg twice daily for another 5 days   Please follow up with neurosugery Dr Navarro    Diagnosis: Abnormal transaminases  Assessment and Plan of Treatment: You had some mildly elevated liver numbers, which were getting better. You had an ultrasound of your liver which was normal.   Please follow-up with your PMD

## 2024-04-05 NOTE — DISCHARGE NOTE PROVIDER - PROVIDER TOKENS
I could do 2/6 1145 or 2/7 1230pm   PROVIDER:[TOKEN:[[id number]]],PROVIDER:[TOKEN:[[id number]]]

## 2024-04-05 NOTE — DISCHARGE NOTE PROVIDER - NSDCMRMEDTOKEN_GEN_ALL_CORE_FT
ALPRAZolam 0.5 mg oral tablet: 1 tab(s) orally once a day as needed for  anxiety  cholecalciferol 25 mcg (1000 intl units) oral tablet: 1 tab(s) orally once a day  citalopram 10 mg oral tablet: 1 tab(s) orally once a day  levETIRAcetam 500 mg oral tablet: 1 tab(s) orally 2 times a day  Magnesium Tablet: 1 tablet daily at bedtime  Multiple Vitamins oral tablet: 1 tab(s) orally once a day  Vyvanse 40 mg oral capsule: 1 cap(s) orally once a day as needed   ALPRAZolam 0.5 mg oral tablet: 1 tab(s) orally once a day as needed for  anxiety  cholecalciferol 25 mcg (1000 intl units) oral tablet: 1 tab(s) orally once a day  citalopram 10 mg oral tablet: 1 tab(s) orally once a day  levETIRAcetam 500 mg oral tablet: 1 tab(s) orally 2 times a day  Magnesium Tablet: 1 tablet daily at bedtime  Multiple Vitamins oral tablet: 1 tab(s) orally once a day  traMADol 25 mg oral tablet: 1 tab(s) orally every 8 hours as needed for  severe pain MDD: 3 tabs  Vyvanse 40 mg oral capsule: 1 cap(s) orally once a day as needed   cholecalciferol 25 mcg (1000 intl units) oral tablet: 1 tab(s) orally once a day  citalopram 10 mg oral tablet: 1 tab(s) orally once a day  levETIRAcetam 500 mg oral tablet: 1 tab(s) orally 2 times a day  Magnesium Tablet: 1 tablet daily at bedtime  Multiple Vitamins oral tablet: 1 tab(s) orally once a day  traMADol 25 mg oral tablet: 1 tab(s) orally every 8 hours as needed for  severe pain MDD: 3 tabs  Vyvanse 40 mg oral capsule: 1 cap(s) orally once a day as needed

## 2024-04-05 NOTE — DISCHARGE NOTE PROVIDER - ATTENDING DISCHARGE PHYSICAL EXAMINATION:
Pt seen and examined. Doing well, feels fine - has been ambulating without lightheadedness, dizziness, no near syncope, no CP or palpitations   Endorsing mild R sided HA, no anxiety    PHYSICAL EXAM:  GENERAL: NAD, well-developed  HEAD:  Atraumatic, normocephalic  EYES: EOMI, conjunctiva and sclera clear  NECK: Supple, no JVD  CHEST/LUNG: Clear to auscultation bilaterally; no wheezing or rales  HEART: Regular rate and rhythm; no murmurs, no LE edema   ABDOMEN: Soft, nontender, nondistended; bowel sounds present  EXTREMITIES:  2+ Peripheral Pulses, no clubbing, cyanosis  PSYCH: AAOx3, calm affect, not anxious  SKIN: No rashes or lesions    Pt pending TTE given c/f possible convulsive syncope - if WNL, plan to d/c home today   will d/c on keppra 500mg BID for 4 more days   d/w pt who is in agreement

## 2024-04-05 NOTE — DISCHARGE NOTE PROVIDER - NSDCCPTREATMENT_GEN_ALL_CORE_FT
PRINCIPAL PROCEDURE  Procedure: CT head  Findings and Treatment: Parenchymal volume loss and chronic microvessel ischemic changes are   again seen.  Acute subdural hematoma involving the right posterior frontal/parietal   and posterior interhemispheric region is again seen as well as the right   tentorial region. These findings were present on prior study and appear   relatively unchanged when allowing for differences in technique. No   significant shift or herniation is seen  Evaluation of the osseous structures with the appropriate window appears   unremarkable  The visualized paranasal sinuses mastoid and middle ear are clear.  Impression: Acute subdural hematoma identified as described above.        SECONDARY PROCEDURE  Procedure: Liver US  Findings and Treatment: Liver: Within normal limits.  Bile ducts: Normal caliber. Common bile duct measures 3 mm.  Gallbladder: Within normal limits. No gallbladder wall thickening.  Pancreas: Visualized portions are within normal limits.  Right kidney: 10.6 cm. No hydronephrosis.  Ascites: None.  IVC: Visualized portions are within normal limits.  IMPRESSION:  Normal right upper quadrant abdominal ultrasound.  No evidence of acute cholecystitis or choledocholithiasis.       PRINCIPAL PROCEDURE  Procedure: CT head  Findings and Treatment: Parenchymal volume loss and chronic microvessel ischemic changes are   again seen.  Acute subdural hematoma involving the right posterior frontal/parietal   and posterior interhemispheric region is again seen as well as the right   tentorial region. These findings were present on prior study and appear   relatively unchanged when allowing for differences in technique. No   significant shift or herniation is seen  Evaluation of the osseous structures with the appropriate window appears   unremarkable  The visualized paranasal sinuses mastoid and middle ear are clear.  Impression: Acute subdural hematoma identified as described above.        SECONDARY PROCEDURE  Procedure: Liver US  Findings and Treatment: Liver: Within normal limits.  Bile ducts: Normal caliber. Common bile duct measures 3 mm.  Gallbladder: Within normal limits. No gallbladder wall thickening.  Pancreas: Visualized portions are within normal limits.  Right kidney: 10.6 cm. No hydronephrosis.  Ascites: None.  IVC: Visualized portions are within normal limits.  IMPRESSION:  Normal right upper quadrant abdominal ultrasound.  No evidence of acute cholecystitis or choledocholithiasis.      Procedure: Transthoracic echocardiography (TTE)  Findings and Treatment: Performed on 4/5/24 prior to discharge. Results unremarkable, within normal limits   1. Left ventricular cavity is normal in size. Left ventricular wall thickness is normal. Left ventricular systolic function is normal. There are no regional wall motion abnormalities seen.   2. Normal left ventricular diastolic function, with normal filling pressure.   3. Normal right ventricular cavity size, with normal wall thickness, and normal systolic function.   4. No pericardial effusion seen.   5. No prior echocardiogram is available for comparison.

## 2024-04-06 ENCOUNTER — TRANSCRIPTION ENCOUNTER (OUTPATIENT)
Age: 44
End: 2024-04-06

## 2024-04-06 PROCEDURE — 85730 THROMBOPLASTIN TIME PARTIAL: CPT

## 2024-04-06 PROCEDURE — 82330 ASSAY OF CALCIUM: CPT

## 2024-04-06 PROCEDURE — 87637 SARSCOV2&INF A&B&RSV AMP PRB: CPT

## 2024-04-06 PROCEDURE — 85610 PROTHROMBIN TIME: CPT

## 2024-04-06 PROCEDURE — 93306 TTE W/DOPPLER COMPLETE: CPT

## 2024-04-06 PROCEDURE — 76705 ECHO EXAM OF ABDOMEN: CPT

## 2024-04-06 PROCEDURE — 96367 TX/PROPH/DG ADDL SEQ IV INF: CPT

## 2024-04-06 PROCEDURE — 84443 ASSAY THYROID STIM HORMONE: CPT

## 2024-04-06 PROCEDURE — 76376 3D RENDER W/INTRP POSTPROCES: CPT

## 2024-04-06 PROCEDURE — 82435 ASSAY OF BLOOD CHLORIDE: CPT

## 2024-04-06 PROCEDURE — 84295 ASSAY OF SERUM SODIUM: CPT

## 2024-04-06 PROCEDURE — 86900 BLOOD TYPING SEROLOGIC ABO: CPT

## 2024-04-06 PROCEDURE — 82962 GLUCOSE BLOOD TEST: CPT

## 2024-04-06 PROCEDURE — 84702 CHORIONIC GONADOTROPIN TEST: CPT

## 2024-04-06 PROCEDURE — 85018 HEMOGLOBIN: CPT

## 2024-04-06 PROCEDURE — 36415 COLL VENOUS BLD VENIPUNCTURE: CPT

## 2024-04-06 PROCEDURE — 80307 DRUG TEST PRSMV CHEM ANLYZR: CPT

## 2024-04-06 PROCEDURE — 84132 ASSAY OF SERUM POTASSIUM: CPT

## 2024-04-06 PROCEDURE — 82803 BLOOD GASES ANY COMBINATION: CPT

## 2024-04-06 PROCEDURE — 80053 COMPREHEN METABOLIC PANEL: CPT

## 2024-04-06 PROCEDURE — 85396 CLOTTING ASSAY WHOLE BLOOD: CPT

## 2024-04-06 PROCEDURE — 80074 ACUTE HEPATITIS PANEL: CPT

## 2024-04-06 PROCEDURE — 71045 X-RAY EXAM CHEST 1 VIEW: CPT

## 2024-04-06 PROCEDURE — 85025 COMPLETE CBC W/AUTO DIFF WBC: CPT

## 2024-04-06 PROCEDURE — 82947 ASSAY GLUCOSE BLOOD QUANT: CPT

## 2024-04-06 PROCEDURE — 96361 HYDRATE IV INFUSION ADD-ON: CPT

## 2024-04-06 PROCEDURE — 96365 THER/PROPH/DIAG IV INF INIT: CPT

## 2024-04-06 PROCEDURE — 86850 RBC ANTIBODY SCREEN: CPT

## 2024-04-06 PROCEDURE — 82550 ASSAY OF CK (CPK): CPT

## 2024-04-06 PROCEDURE — 99285 EMERGENCY DEPT VISIT HI MDM: CPT | Mod: 25

## 2024-04-06 PROCEDURE — 70450 CT HEAD/BRAIN W/O DYE: CPT | Mod: MC

## 2024-04-06 PROCEDURE — 86901 BLOOD TYPING SEROLOGIC RH(D): CPT

## 2024-04-06 PROCEDURE — 83605 ASSAY OF LACTIC ACID: CPT

## 2024-04-06 PROCEDURE — 99232 SBSQ HOSP IP/OBS MODERATE 35: CPT

## 2024-04-06 PROCEDURE — 85027 COMPLETE CBC AUTOMATED: CPT

## 2024-04-06 PROCEDURE — 96375 TX/PRO/DX INJ NEW DRUG ADDON: CPT

## 2024-04-06 PROCEDURE — 84436 ASSAY OF TOTAL THYROXINE: CPT

## 2024-04-06 PROCEDURE — 82140 ASSAY OF AMMONIA: CPT

## 2024-04-06 PROCEDURE — 81001 URINALYSIS AUTO W/SCOPE: CPT

## 2024-04-06 PROCEDURE — 93356 MYOCRD STRAIN IMG SPCKL TRCK: CPT

## 2024-04-06 PROCEDURE — 85014 HEMATOCRIT: CPT

## 2024-04-06 PROCEDURE — 84480 ASSAY TRIIODOTHYRONINE (T3): CPT

## 2024-04-06 PROCEDURE — 96366 THER/PROPH/DIAG IV INF ADDON: CPT

## 2024-04-06 RX ORDER — ALPRAZOLAM 0.25 MG
1 TABLET ORAL
Refills: 0 | DISCHARGE

## 2024-04-06 RX ORDER — ACETAMINOPHEN 500 MG
1000 TABLET ORAL ONCE
Refills: 0 | Status: COMPLETED | OUTPATIENT
Start: 2024-04-06 | End: 2024-04-06

## 2024-04-06 RX ADMIN — Medication 100 MILLIGRAM(S): at 11:07

## 2024-04-06 RX ADMIN — LEVETIRACETAM 500 MILLIGRAM(S): 250 TABLET, FILM COATED ORAL at 06:05

## 2024-04-06 RX ADMIN — CITALOPRAM 10 MILLIGRAM(S): 10 TABLET, FILM COATED ORAL at 11:07

## 2024-04-06 RX ADMIN — Medication 1 TABLET(S): at 11:07

## 2024-04-06 RX ADMIN — Medication 400 MILLIGRAM(S): at 06:07

## 2024-04-06 RX ADMIN — Medication 1 MILLIGRAM(S): at 11:07

## 2024-04-06 NOTE — PROGRESS NOTE ADULT - PROBLEM SELECTOR PLAN 7
Patient with anion gap metabolic acidosis, secondary to alcoholic ketoacidosis vs lactic acidosis  s/p IVF in ED, s/p further IVF and encouraged po intake
Patient with anion gap metabolic acidosis, secondary to alcoholic ketoacidosis vs lactic acidosis  s/p IVF in ED, continue with maintenance IVF for now

## 2024-04-06 NOTE — DISCHARGE NOTE NURSING/CASE MANAGEMENT/SOCIAL WORK - PATIENT PORTAL LINK FT
You can access the FollowMyHealth Patient Portal offered by [facility] by registering at the following website: [email or web address]. By joining [email or web address]’s FollowMyHealth portal, you will also be able to view your health information using other applications (apps) compatible with our system.

## 2024-04-06 NOTE — PROGRESS NOTE ADULT - TIME BILLING
reviewing emr, previous labs and imaging, coordination of care, discussion with patient, documentation

## 2024-04-06 NOTE — PROGRESS NOTE ADULT - PROBLEM SELECTOR PLAN 1
Patient presenting with episode of seizure-like activity - pt states she has intermittent episodes of near syncope, but had never lost consciousness prior - unclear if this is true seizure vs convulsive syncope. Pt with ?etoh abuse, but pt adamantly denies, alcohol level on admission negative   - pt s/p keppra load, continue Keppra 500mg BID x 7 days per neurosurgery  - TTE wnl
Patient presenting with episode of seizure-like activity - pt states she has intermittent episodes of near syncope, but had never lost consciousness prior - unclear if this is true seizure vs convulsive syncope. Pt with ?etoh abuse, but pt adamantly denies, alcohol level on admission negative   - pt s/p keppra load, continue Keppra 500mg BID x 7 days per neurosurgery  - c/w CIWA as below  - will check TTE to eval HF   - Seizure Precautions  - CPK slightly elevated, c/w IVF

## 2024-04-06 NOTE — PROGRESS NOTE ADULT - PROBLEM SELECTOR PLAN 4
?etoh abuse and withdrawal - per pt, denies excessive use  - s/p CIWA   - SBIRT declined
?etoh abuse and withdrawal - per pt, denies excessive use  - c/w symptom triggered CIWA for now, minimal use, pt only triggering CIWA for HA, which is more likely from SAH   - SBIRT eval appreciated

## 2024-04-06 NOTE — PROGRESS NOTE ADULT - PROBLEM SELECTOR PLAN 6
AST/ALT mildly elevated, improving today   RUQ-US shows no pathology  - c/t trend
AST/ALT mildly elevated, improving today   RUQ-US shows no pathology

## 2024-04-06 NOTE — PROGRESS NOTE ADULT - PROBLEM SELECTOR PROBLEM 2
Grade II hypertensive retinopathy.
Traumatic subdural hematoma (SDH)
Traumatic subdural hematoma (SDH)

## 2024-04-06 NOTE — DISCHARGE NOTE NURSING/CASE MANAGEMENT/SOCIAL WORK - NSDCVIVACCINE_GEN_ALL_CORE_FT
Tdap; 03-Jun-2013 01:00; Ginna Garcia (IRIS); [employer]; [id number] (Exp. Date: 18-Oct-2015); IM; LArm; 0.5 cc;

## 2024-04-06 NOTE — DISCHARGE NOTE NURSING/CASE MANAGEMENT/SOCIAL WORK - NSDCPEFALRISK_GEN_ALL_CORE
For information on Fall & Injury Prevention, visit: [email or web address] OR  [email or web address] OR  https://www.cdc.gov/steadi/patient.html

## 2024-04-06 NOTE — PROGRESS NOTE ADULT - ASSESSMENT
43F with PMHx of ADHD, Anxiety, Palpitations, Alcohol Use Disorder presents due to seizure-like activity. 
43F with PMHx of ADHD, Anxiety, Palpitations, Alcohol Use Disorder presents due to seizure-like activity.

## 2024-04-06 NOTE — PROGRESS NOTE ADULT - PROBLEM SELECTOR PLAN 2
CTH with small right holohemispheric subdural hematoma with trace hemorrhage along the posterior right falx and layering on the right tentorium. Trace right parietal subarachnoid hemorrhage.   Repeat CTH in 46H showed stable SAH/SDH; repeat 24hr CTH performed, also with no significant changes from prior imaging    Neurosurgery recommendations appreciated - No acute neurosurgical intervention. s/p DDAVP and Keppra load.  Continue Keppra as above
CTH with small right holohemispheric subdural hematoma with trace hemorrhage along the posterior right falx and layering on the right tentorium. Trace right parietal subarachnoid hemorrhage.   Repeat CTH in 46H showed stable SAH/SDH; repeat 24hr CTH performed, also with no significant changes from prior imaging    Neurosurgery recommendations appreciated - No acute neurosurgical intervention. s/p DDAVP and Keppra load.  Continue Keppra as above   Seizure Precautions. Neuro Checks Q4H

## 2024-04-06 NOTE — PROGRESS NOTE ADULT - PROBLEM SELECTOR PLAN 8
Patient reports she takes citalopram and alprazolam PRN- though self-discontinued as she felt she was feeling fine.  Resume citalopram 10mg daily  has diazepam ordered per JAMILAWA    DVT PPx: Low risk
Patient reports she takes citalopram and alprazolam PRN- though self-discontinued as she felt she was feeling fine.  Resume citalopram 10mg daily    DVT PPx: Low risk

## 2024-04-06 NOTE — PROGRESS NOTE ADULT - SUBJECTIVE AND OBJECTIVE BOX
[facility] Division of Hospital Medicine  Kurtis Goodman MD  Available via MS Teams    SUBJECTIVE / OVERNIGHT EVENTS:  no acute events overnight   endorsing R sided headache and anxiety this AM  no CP or SOB, no abd pain, tolerated diet   walking to/from the bathroom without lightheadedness of dizziness     ADDITIONAL REVIEW OF SYSTEMS:  14 point ROS negative except as noted above     MEDICATIONS  (STANDING):  citalopram 10 milliGRAM(s) Oral daily  folic acid 1 milliGRAM(s) Oral daily  levETIRAcetam 500 milliGRAM(s) Oral two times a day  multivitamin 1 Tablet(s) Oral daily  sodium chloride 0.9%. 1000 milliLiter(s) (75 mL/Hr) IV Continuous <Continuous>  thiamine 100 milliGRAM(s) Oral daily    MEDICATIONS  (PRN):  acetaminophen     Tablet .. 650 milliGRAM(s) Oral every 6 hours PRN Temp greater or equal to 38C (100.4F), Mild Pain (1 - 3), Moderate Pain (4 - 6)  diazepam    Tablet 10 milliGRAM(s) Oral every 2 hours PRN CIWA-Ar score increase by 2 points and a total score of 7 or less  melatonin 3 milliGRAM(s) Oral at bedtime PRN Insomnia  ondansetron Injectable 4 milliGRAM(s) IV Push every 8 hours PRN Nausea and/or Vomiting      I&O's Summary      PHYSICAL EXAM:  Vital Signs Last 24 Hrs  T(C): 36.7 (04 Apr 2024 10:30), Max: 36.9 (04 Apr 2024 07:40)  T(F): 98.1 (04 Apr 2024 10:30), Max: 98.4 (04 Apr 2024 07:40)  HR: 80 (04 Apr 2024 10:30) (70 - 85)  BP: 136/100 (04 Apr 2024 10:30) (111/75 - 142/94)  BP(mean): --  RR: 18 (04 Apr 2024 10:30) (18 - 18)  SpO2: 95% (04 Apr 2024 10:30) (95% - 99%)    Parameters below as of 04 Apr 2024 07:40  Patient On (Oxygen Delivery Method): room air      PHYSICAL EXAM:  GENERAL: NAD, well-developed  HEAD:  Atraumatic, normocephalic  EYES: EOMI, conjunctiva and sclera clear  NECK: Supple, no JVD  CHEST/LUNG: Clear to auscultation bilaterally; no wheezing or rales  HEART: Regular rate and rhythm; no murmurs, no LE edema   ABDOMEN: Soft, nontender, nondistended; bowel sounds present  EXTREMITIES:  2+ Peripheral Pulses, no clubbing, cyanosis  PSYCH: AAOx3, calm affect, not anxious  SKIN: No rashes or lesions      LABS:                        12.3   3.91  )-----------( 124      ( 04 Apr 2024 07:41 )             36.8     04-04    130<L>  |  94<L>  |  7   ----------------------------<  98  3.6   |  18<L>  |  0.38<L>    Ca    9.0      04 Apr 2024 07:41    TPro  6.9  /  Alb  4.0  /  TBili  0.8  /  DBili  x   /  AST  136<H>  /  ALT  108<H>  /  AlkPhos  91  04-04    PT/INR - ( 03 Apr 2024 01:51 )   PT: 10.5 sec;   INR: 1.00 ratio         PTT - ( 03 Apr 2024 01:51 )  PTT:22.6 sec  CARDIAC MARKERS ( 04 Apr 2024 07:41 )  x     / x     / 474 U/L / x     / x      CARDIAC MARKERS ( 03 Apr 2024 15:13 )  x     / x     / 715 U/L / x     / x          Urinalysis Basic - ( 04 Apr 2024 07:41 )    Color: x / Appearance: x / SG: x / pH: x  Gluc: 98 mg/dL / Ketone: x  / Bili: x / Urobili: x   Blood: x / Protein: x / Nitrite: x   Leuk Esterase: x / RBC: x / WBC x   Sq Epi: x / Non Sq Epi: x / Bacteria: x            RADIOLOGY & ADDITIONAL TESTS:  New Imaging Personally Reviewed Today:  New Electrocardiogram Personally Reviewed Today:  Other Results Reviewed Today:   Prior or Outpatient Records Reviewed Today with Summary:    COORDINATION OF CARE:  Consultant Communication and Details of Discussion (where applicable):    
[facility] Division of Hospital Medicine  Prasanth Cheng  MS Teams      SUBJECTIVE / OVERNIGHT EVENTS:  No events overnight  denies cp/sob/tremors  mild headache slowly improving    ADDITIONAL REVIEW OF SYSTEMS:    MEDICATIONS  (STANDING):  citalopram 10 milliGRAM(s) Oral daily  folic acid 1 milliGRAM(s) Oral daily  levETIRAcetam 500 milliGRAM(s) Oral two times a day  multivitamin 1 Tablet(s) Oral daily  sodium chloride 0.9%. 1000 milliLiter(s) (75 mL/Hr) IV Continuous <Continuous>  thiamine 100 milliGRAM(s) Oral daily    MEDICATIONS  (PRN):  acetaminophen     Tablet .. 650 milliGRAM(s) Oral every 6 hours PRN Temp greater or equal to 38C (100.4F), Mild Pain (1 - 3), Moderate Pain (4 - 6)  diazepam    Tablet 10 milliGRAM(s) Oral every 2 hours PRN CIWA-Ar score increase by 2 points and a total score of 7 or less  melatonin 3 milliGRAM(s) Oral at bedtime PRN Insomnia  ondansetron Injectable 4 milliGRAM(s) IV Push every 8 hours PRN Nausea and/or Vomiting      I&O's Summary    05 Apr 2024 07:01  -  06 Apr 2024 07:00  --------------------------------------------------------  IN: 100 mL / OUT: 0 mL / NET: 100 mL        PHYSICAL EXAM:  Vital Signs Last 24 Hrs  T(C): 36.6 (05 Apr 2024 22:41), Max: 36.8 (05 Apr 2024 16:00)  T(F): 97.9 (05 Apr 2024 22:41), Max: 98.3 (05 Apr 2024 16:00)  HR: 87 (05 Apr 2024 22:41) (83 - 87)  BP: 130/90 (05 Apr 2024 22:41) (125/85 - 130/90)  BP(mean): --  RR: 18 (05 Apr 2024 22:41) (18 - 18)  SpO2: 99% (05 Apr 2024 22:41) (95% - 99%)    Parameters below as of 05 Apr 2024 22:41  Patient On (Oxygen Delivery Method): room air      CONSTITUTIONAL: NAD, well-developed  ENMT: Moist oral mucosa, no pharyngeal injection or exudates  RESPIRATORY: Normal respiratory effort; lungs are clear to auscultation bilaterally  CARDIOVASCULAR: Regular rate and rhythm, normal S1 and S2, no murmur: No lower extremity edema  ABDOMEN: Nontender to palpation  MUSCULOSKELETAL: no clubbing or cyanosis of digits; no joint swelling or tenderness to palpation  PSYCH: A+O to person, place, and time; affect appropriate  NEUROLOGY: CN 2-12 are intact and symmetric; no gross sensory deficits   SKIN: No rashes; no palpable lesions    LABS:                        12.6   4.82  )-----------( 161      ( 05 Apr 2024 07:56 )             37.1     04-05    136  |  102  |  9   ----------------------------<  91  4.4   |  21<L>  |  0.50    Ca    9.1      05 Apr 2024 07:56    TPro  6.5  /  Alb  4.0  /  TBili  0.6  /  DBili  x   /  AST  79<H>  /  ALT  88<H>  /  AlkPhos  81  04-05          Urinalysis Basic - ( 05 Apr 2024 07:56 )    Color: x / Appearance: x / SG: x / pH: x  Gluc: 91 mg/dL / Ketone: x  / Bili: x / Urobili: x   Blood: x / Protein: x / Nitrite: x   Leuk Esterase: x / RBC: x / WBC x   Sq Epi: x / Non Sq Epi: x / Bacteria: x

## 2024-04-11 DIAGNOSIS — S06.5XAA TRAUMATIC SUBDURAL HEMORRHAGE WITH LOSS OF CONSCIOUSNESS STATUS UNKNOWN, INITIAL ENCOUNTER: ICD-10-CM

## 2024-04-12 PROBLEM — F90.9 ATTENTION-DEFICIT HYPERACTIVITY DISORDER, UNSPECIFIED TYPE: Chronic | Status: ACTIVE | Noted: 2024-04-03

## 2024-04-12 PROBLEM — F41.9 ANXIETY DISORDER, UNSPECIFIED: Chronic | Status: ACTIVE | Noted: 2024-04-03

## 2024-04-12 PROBLEM — Z78.9 OTHER SPECIFIED HEALTH STATUS: Chronic | Status: ACTIVE | Noted: 2024-04-03

## 2024-04-16 ENCOUNTER — OUTPATIENT (OUTPATIENT)
Dept: OUTPATIENT SERVICES | Facility: HOSPITAL | Age: 44
LOS: 1 days | End: 2024-04-16

## 2024-04-16 DIAGNOSIS — Z00.8 ENCOUNTER FOR OTHER GENERAL EXAMINATION: ICD-10-CM

## 2024-04-22 ENCOUNTER — RESULT REVIEW (OUTPATIENT)
Age: 44
End: 2024-04-22

## 2024-04-22 ENCOUNTER — OUTPATIENT (OUTPATIENT)
Dept: OUTPATIENT SERVICES | Facility: HOSPITAL | Age: 44
LOS: 1 days | End: 2024-04-22
Payer: COMMERCIAL

## 2024-04-22 ENCOUNTER — APPOINTMENT (OUTPATIENT)
Dept: CT IMAGING | Facility: HOSPITAL | Age: 44
End: 2024-04-22
Payer: COMMERCIAL

## 2024-04-22 DIAGNOSIS — S06.5XAA TRAUMATIC SUBDURAL HEMORRHAGE WITH LOSS OF CONSCIOUSNESS STATUS UNKNOWN, INITIAL ENCOUNTER: ICD-10-CM

## 2024-04-22 PROCEDURE — 70450 CT HEAD/BRAIN W/O DYE: CPT

## 2024-04-22 PROCEDURE — 70450 CT HEAD/BRAIN W/O DYE: CPT | Mod: 26

## 2024-04-26 ENCOUNTER — APPOINTMENT (OUTPATIENT)
Dept: NEUROSURGERY | Facility: CLINIC | Age: 44
End: 2024-04-26
Payer: COMMERCIAL

## 2024-04-26 ENCOUNTER — NON-APPOINTMENT (OUTPATIENT)
Age: 44
End: 2024-04-26

## 2024-04-26 VITALS
BODY MASS INDEX: 21.66 KG/M2 | WEIGHT: 130 LBS | OXYGEN SATURATION: 98 % | HEART RATE: 92 BPM | SYSTOLIC BLOOD PRESSURE: 133 MMHG | HEIGHT: 65 IN | DIASTOLIC BLOOD PRESSURE: 90 MMHG

## 2024-04-26 DIAGNOSIS — Z80.0 FAMILY HISTORY OF MALIGNANT NEOPLASM OF DIGESTIVE ORGANS: ICD-10-CM

## 2024-04-26 PROCEDURE — 99213 OFFICE O/P EST LOW 20 MIN: CPT

## 2024-04-26 PROCEDURE — 99203 OFFICE O/P NEW LOW 30 MIN: CPT

## 2024-04-26 RX ORDER — ASCORBIC ACID 500 MG
TABLET ORAL
Refills: 0 | Status: ACTIVE | COMMUNITY

## 2024-04-26 RX ORDER — OMEGA-3/DHA/EPA/FISH OIL 300-1000MG
CAPSULE ORAL
Refills: 0 | Status: ACTIVE | COMMUNITY

## 2024-04-26 RX ORDER — MULTIVITAMIN
TABLET ORAL
Refills: 0 | Status: ACTIVE | COMMUNITY

## 2024-05-01 NOTE — PHYSICAL EXAM
[General Appearance - Alert] : alert [General Appearance - In No Acute Distress] : in no acute distress [General Appearance - Well Nourished] : well nourished [General Appearance - Well-Appearing] : healthy appearing [Oriented To Time, Place, And Person] : oriented to person, place, and time [Impaired Insight] : insight and judgment were intact [Memory Recent] : recent memory was not impaired [Affect] : the affect was normal [Person] : oriented to person [Place] : oriented to place [Time] : oriented to time [Short Term Intact] : short term memory intact [Cranial Nerves Optic (II)] : visual acuity intact bilaterally,  pupils equal round and reactive to light [Cranial Nerves Oculomotor (III)] : extraocular motion intact [Cranial Nerves Trigeminal (V)] : facial sensation intact symmetrically [Cranial Nerves Facial (VII)] : face symmetrical [Cranial Nerves Vestibulocochlear (VIII)] : hearing was intact bilaterally [Cranial Nerves Accessory (XI - Cranial And Spinal)] : head turning and shoulder shrug symmetric [Cranial Nerves Hypoglossal (XII)] : there was no tongue deviation with protrusion [Motor Tone] : muscle tone was normal in all four extremities [Motor Strength] : muscle strength was normal in all four extremities [Balance] : balance was intact [Sclera] : the sclera and conjunctiva were normal [PERRL With Normal Accommodation] : pupils were equal in size, round, reactive to light, with normal accommodation [Outer Ear] : the ears and nose were normal in appearance [Both Tympanic Membranes Were Examined] : both tympanic membranes were normal [Neck Appearance] : the appearance of the neck was normal [] : no respiratory distress [Respiration, Rhythm And Depth] : normal respiratory rhythm and effort [Apical Impulse] : the apical impulse was normal [Heart Rate And Rhythm] : heart rate was normal and rhythm regular [Arterial Pulses Carotid] : carotid pulses were normal with no bruits [No CVA Tenderness] : no ~M costovertebral angle tenderness [No Spinal Tenderness] : no spinal tenderness [Abnormal Walk] : normal gait [Involuntary Movements] : no involuntary movements were seen [Skin Color & Pigmentation] : normal skin color and pigmentation [Tremor] : no tremor present [FreeTextEntry8] : no drift, no imbalance at tandem stance

## 2024-05-01 NOTE — ASSESSMENT
[FreeTextEntry1] : IMPRESSION 43F with PMHx of ADHD, Anxiety, Palpitations with SDH and SAH after a fall. As per record, she was found on the floor by her family.  Per patient, no prior history of seizures or alcohol withdrawal, even though family reports she is evasive about alcohol use. Pt currently doing well, other than c/o head pressure, she does meditation/yoga consistently being a . Need repeat imaging for follow up.   PLAN:  MRI Head w/o contrast  Note for recommendation not to travel by flight. F/U in 1 month after imaging

## 2024-05-01 NOTE — HISTORY OF PRESENT ILLNESS
[FreeTextEntry1] : Hospital Course:  Discharge Date 05-Apr-2024  Admission Date 03-Apr-2024 05:53  Reason for Admission Seizure  Hospital Course   HPI:  43F with PMHx of ADHD, Anxiety, Palpitations, Alcohol Use Disorder presents due  to seizure-like activity. Patient was seen and examined at bedside-  she is  AAOx4. She remembers eating dinner last evening. She picked up some boxes to  bring upstairs and the next thing she remembers is being on the floor. She was  in her usual state of health prior to this -no recent sick contact/travel. She  notes that she is prescribed lisdexamfetamine for ADHD and citalopram and  alprazolam PRN for anxiety - she felt like she was feeling fine and has not  been taking them for the last week.  Per chart review, she was found on the floor by her family after hearing a  thud. She was found to have "generalized shaking with lack of response to  verbal stimuli." Once she was awake - she was confused and belligerent with  EMS. Per patient, no prior history of seizures or alcohol withdrawal. She  reports her last alcohol drink was a month ago, though she told ED physician it  was 4 days ago. Family at bedside reports she is evasive about alcohol use.  They have been "trying to get her to rehab, so she may have been attempting  recently to cut back"    At present, patient reports headache and anxiety. Denies fever, chills, chest  pain, palpitations, dyspnea, N/V, abdominal pain, dysuria, diarrhea. (03 Apr 2024 09:25)    Hospital Course:  Pt admitted for seizure like activity - pt had CTH on admission which showed  small R holohemispheric SDH and trace R parietal SAH. Pt given keppra load and  started on keppra x 7 days; NSGY evaluated pt and recommended repeat CTH for  stability - had 6h and 24hr repeat scans which were showed stable bleed. Pt  also placed on CIWA on admission for possible etoh abuse/withdrawal, but pt  denies heavy alcohol use and CIWA monitoring only noted R sided headache (more  likely from her fall). Pt refused all SBIRT services. Of note, pt states she  has had presyncopal events in the past - her seizure like activity may have  been convulsive syncope. Thus TTE was checked, which showed _____________.  Pt with stable vitals, not triggering CIWA, ambulating without issue.  Stable for d/c home.   f/u Out pt   Today pt feels ok, feel headache on the right side, feeling like pressure, no dizziness, no balance issue. She is a , doing consistent meditations and yoga. Taking Advil for pain which is helping. She also does warm compressor most of the time which help with her head pressure. Pt doing her ADLs with no support. Pt and family trying to take flight to [place], would like to know if she can travel.

## 2024-05-10 ENCOUNTER — APPOINTMENT (OUTPATIENT)
Dept: MRI IMAGING | Facility: CLINIC | Age: 44
End: 2024-05-10

## 2024-05-31 ENCOUNTER — APPOINTMENT (OUTPATIENT)
Dept: NEUROSURGERY | Facility: CLINIC | Age: 44
End: 2024-05-31